# Patient Record
Sex: FEMALE | Race: WHITE | NOT HISPANIC OR LATINO | URBAN - METROPOLITAN AREA
[De-identification: names, ages, dates, MRNs, and addresses within clinical notes are randomized per-mention and may not be internally consistent; named-entity substitution may affect disease eponyms.]

---
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
## 2019-03-21 ENCOUNTER — OFFICE VISIT (OUTPATIENT)
Dept: URGENT CARE | Facility: CLINIC | Age: 19
End: 2019-03-21
Payer: COMMERCIAL

## 2019-03-21 VITALS
RESPIRATION RATE: 16 BRPM | SYSTOLIC BLOOD PRESSURE: 112 MMHG | BODY MASS INDEX: 24.89 KG/M2 | HEART RATE: 86 BPM | DIASTOLIC BLOOD PRESSURE: 62 MMHG | OXYGEN SATURATION: 98 % | HEIGHT: 68 IN | WEIGHT: 164.2 LBS | TEMPERATURE: 99.3 F

## 2019-03-21 DIAGNOSIS — H10.33 ACUTE BACTERIAL CONJUNCTIVITIS OF BOTH EYES: Primary | ICD-10-CM

## 2019-03-21 PROCEDURE — 99213 OFFICE O/P EST LOW 20 MIN: CPT | Performed by: PHYSICIAN ASSISTANT

## 2019-03-21 RX ORDER — POLYMYXIN B SULFATE AND TRIMETHOPRIM 1; 10000 MG/ML; [USP'U]/ML
1 SOLUTION OPHTHALMIC 3 TIMES DAILY
Qty: 10 ML | Refills: 0 | Status: SHIPPED | OUTPATIENT
Start: 2019-03-21 | End: 2019-03-28

## 2019-03-21 RX ORDER — POLYMYXIN B SULFATE AND TRIMETHOPRIM 1; 10000 MG/ML; [USP'U]/ML
1 SOLUTION OPHTHALMIC 3 TIMES DAILY
Qty: 10 ML | Refills: 0 | Status: SHIPPED | OUTPATIENT
Start: 2019-03-21 | End: 2019-03-21 | Stop reason: SDUPTHER

## 2019-03-21 ASSESSMENT — PAIN SCALES - GENERAL: PAINLEVEL: 0-NO PAIN

## 2019-03-21 NOTE — PROGRESS NOTES
"Clinic Note     SUBJECTIVE    Chief Complaint:  Chief Complaint   Patient presents with   • Conjunctivitis     possibel pink eye int he lefy eye. Woke up yesterday morning with it crusted shut        HPI:    Oraila Bee is a 18 y.o. female who presents for Conjunctivitis (possibel pink eye in the lefy eye. Woke up yesterday morning with it crusted shut )  .  Her right eye is slightly irritated, but not too bad.  She has had cold symptoms/congestion over the last 2-3 days, but nothing \"major\".  No visual changes.    The following have been reviewed and updated as appropriate in this visit:         No Known Allergies  Current Outpatient Medications   Medication Sig Dispense Refill   • levonorgestrel (KYLEENA) 17.5 mcg/24 hrs (5 yrs) 19.5 mg IUD 1 Device by intrauterine route Every 5 (five) years     • polymyxin B sulf-trimethoprim (POLYTRIM) ophthalmic solution Administer 1 drop into both eyes 3 (three) times a day for 7 days 10 mL 0     No current facility-administered medications for this visit.      Past Medical History:   Diagnosis Date   • Factor 5 Leiden mutation, heterozygous (CMS/HCC) ([facility])      Past Surgical History:   Procedure Laterality Date   • EXPLORATORY LAPAROTOMY     • TONSILLECTOMY AND ADENOIDECTOMY     • WISDOM TOOTH EXTRACTION       Family History   Problem Relation Age of Onset   • No Known Problems Mother    • No Known Problems Father      Social History     Socioeconomic History   • Marital status: Single     Spouse name: Not on file   • Number of children: Not on file   • Years of education: Not on file   • Highest education level: Not on file   Social Needs   • Financial resource strain: Not on file   • Food insecurity - worry: Not on file   • Food insecurity - inability: Not on file   • Transportation needs - medical: Not on file   • Transportation needs - non-medical: Not on file   Occupational History   • Not on file   Tobacco Use   • Smoking status: Never Smoker   • Smokeless tobacco: " "Never Used   Substance and Sexual Activity   • Alcohol use: Not on file   • Drug use: Not on file   • Sexual activity: Not on file   Other Topics Concern   • Not on file   Social History Narrative   • Not on file       Review of Systems:  Review of Systems   As per HPI    OBJECTIVE     Vitals:  /62 (BP Location: Right arm, Patient Position: Sitting, Cuff Size: Reg)   Pulse 86   Temp 37.4 °C (99.3 °F) (Temporal)   Resp 16   Ht 1.727 m (5' 8\")   Wt 74.5 kg   LMP 02/28/2019 (Exact Date)   SpO2 98%   BMI 24.97 kg/m²     PE:  Physical Exam   Constitutional: She appears well-developed and well-nourished. No distress.   HENT:   Head: Normocephalic and atraumatic.   Right Ear: Tympanic membrane normal.   Left Ear: Tympanic membrane normal.   Nose: No mucosal edema or rhinorrhea.   Mouth/Throat: Oropharynx is clear and moist.   Eyes: Pupils are equal, round, and reactive to light. EOM are normal. Right eye exhibits no discharge. Left eye exhibits discharge. Right conjunctiva is injected. Left conjunctiva is injected.   Neck: Neck supple.   Cardiovascular: Normal rate and regular rhythm.   Pulmonary/Chest: Effort normal.   Skin: Skin is warm and dry.   Psychiatric: She has a normal mood and affect. Her behavior is normal. Judgment and thought content normal.   Nursing note and vitals reviewed.        No results found for this or any previous visit (from the past 12 hour(s)).      ASSESSMENT:    Oralia was seen today for conjunctivitis.    Diagnoses and all orders for this visit:    Acute bacterial conjunctivitis of both eyes  -     Discontinue: polymyxin B sulf-trimethoprim (POLYTRIM) ophthalmic solution; Administer 1 drop into both eyes 3 (three) times a day for 7 days  -     polymyxin B sulf-trimethoprim (POLYTRIM) ophthalmic solution; Administer 1 drop into both eyes 3 (three) times a day for 7 days          PLAN  Patient advised to use the antibiotic drops as prescribed; use warm compresses to the eyes, avoid " eye strain; no contacts until antibiotics complete; return if not improving. Recheck if any change in vision, increased pain, or any concerning symptoms.      Patient verbalizes understanding of above information and will contact C with any further questions or concerns.    ZAN PUENTE

## 2022-05-09 ENCOUNTER — PATIENT MESSAGE (OUTPATIENT)
Dept: HEMATOLOGY/ONCOLOGY | Facility: CLINIC | Age: 22
End: 2022-05-09
Payer: COMMERCIAL

## 2022-05-10 ENCOUNTER — OFFICE VISIT (OUTPATIENT)
Dept: URGENT CARE | Facility: CLINIC | Age: 22
End: 2022-05-10
Payer: COMMERCIAL

## 2022-05-10 VITALS
WEIGHT: 160 LBS | DIASTOLIC BLOOD PRESSURE: 75 MMHG | RESPIRATION RATE: 18 BRPM | HEIGHT: 68 IN | BODY MASS INDEX: 24.25 KG/M2 | TEMPERATURE: 98 F | OXYGEN SATURATION: 97 % | HEART RATE: 82 BPM | SYSTOLIC BLOOD PRESSURE: 108 MMHG

## 2022-05-10 DIAGNOSIS — R07.9 CHEST PAIN, UNSPECIFIED TYPE: ICD-10-CM

## 2022-05-10 DIAGNOSIS — R10.12 COLICKY LEFT UPPER QUADRANT PAIN: Primary | ICD-10-CM

## 2022-05-10 DIAGNOSIS — R07.81 RIB PAIN: ICD-10-CM

## 2022-05-10 PROCEDURE — 3078F PR MOST RECENT DIASTOLIC BLOOD PRESSURE < 80 MM HG: ICD-10-PCS | Mod: CPTII,S$GLB,,

## 2022-05-10 PROCEDURE — 1160F PR REVIEW ALL MEDS BY PRESCRIBER/CLIN PHARMACIST DOCUMENTED: ICD-10-PCS | Mod: CPTII,S$GLB,,

## 2022-05-10 PROCEDURE — 93010 ELECTROCARDIOGRAM REPORT: CPT | Mod: S$GLB,,, | Performed by: INTERNAL MEDICINE

## 2022-05-10 PROCEDURE — 1159F MED LIST DOCD IN RCRD: CPT | Mod: CPTII,S$GLB,,

## 2022-05-10 PROCEDURE — 1159F PR MEDICATION LIST DOCUMENTED IN MEDICAL RECORD: ICD-10-PCS | Mod: CPTII,S$GLB,,

## 2022-05-10 PROCEDURE — 93005 ELECTROCARDIOGRAM TRACING: CPT | Mod: S$GLB,,,

## 2022-05-10 PROCEDURE — 1160F RVW MEDS BY RX/DR IN RCRD: CPT | Mod: CPTII,S$GLB,,

## 2022-05-10 PROCEDURE — 99203 OFFICE O/P NEW LOW 30 MIN: CPT | Mod: S$GLB,,,

## 2022-05-10 PROCEDURE — 3074F PR MOST RECENT SYSTOLIC BLOOD PRESSURE < 130 MM HG: ICD-10-PCS | Mod: CPTII,S$GLB,,

## 2022-05-10 PROCEDURE — 71046 X-RAY EXAM CHEST 2 VIEWS: CPT | Mod: FY,S$GLB,, | Performed by: RADIOLOGY

## 2022-05-10 PROCEDURE — 3074F SYST BP LT 130 MM HG: CPT | Mod: CPTII,S$GLB,,

## 2022-05-10 PROCEDURE — 3008F PR BODY MASS INDEX (BMI) DOCUMENTED: ICD-10-PCS | Mod: CPTII,S$GLB,,

## 2022-05-10 PROCEDURE — 71046 XR CHEST PA AND LATERAL: ICD-10-PCS | Mod: FY,S$GLB,, | Performed by: RADIOLOGY

## 2022-05-10 PROCEDURE — 3008F BODY MASS INDEX DOCD: CPT | Mod: CPTII,S$GLB,,

## 2022-05-10 PROCEDURE — 93005 EKG 12-LEAD: ICD-10-PCS | Mod: S$GLB,,,

## 2022-05-10 PROCEDURE — 93010 EKG 12-LEAD: ICD-10-PCS | Mod: S$GLB,,, | Performed by: INTERNAL MEDICINE

## 2022-05-10 PROCEDURE — 3078F DIAST BP <80 MM HG: CPT | Mod: CPTII,S$GLB,,

## 2022-05-10 PROCEDURE — 99203 PR OFFICE/OUTPT VISIT, NEW, LEVL III, 30-44 MIN: ICD-10-PCS | Mod: S$GLB,,,

## 2022-05-10 RX ORDER — ESCITALOPRAM OXALATE 20 MG/1
10 TABLET ORAL DAILY
COMMUNITY
End: 2022-09-05

## 2022-05-10 RX ORDER — BUPROPION HYDROCHLORIDE 100 MG/1
300 TABLET ORAL DAILY
COMMUNITY
End: 2023-02-22

## 2022-05-10 RX ORDER — QUETIAPINE FUMARATE 100 MG/1
TABLET, FILM COATED ORAL
COMMUNITY
End: 2022-05-10 | Stop reason: ALTCHOICE

## 2022-05-10 NOTE — PATIENT INSTRUCTIONS
- A referral for primary care and gastroenterology has been placed. Call [phone] to schedule an appointment.   - Keep pain diary as discussed. Write down when you feel the pain, what you were doing, what you recently ate or drank.     - Rest.    - Drink plenty of fluids.    - Acetaminophen (tylenol) or Ibuprofen (advil,motrin) as directed as needed for fever/pain. Avoid tylenol if you have a history of liver disease. Do not take ibuprofen if you have a history of GI bleeding, kidney disease, or if you take blood thinners.   - Ibuprofen dosing for adults: 400 mg by mouth every 4-6 hours as needed. Max: 2400 mg/day; Info: use lowest effective dose, shortest effective treatment duration; give w/ food if GI upset occurs.  - Tylenol dosing for adults: [By mouth route, immediate-release form] Dose: 325-1000 mg by mouth every 4-6h as needed; Max: 1 g/4h and 4 g/day from all sources. [By mouth route, extended-release form] Dose: 650-1300 mg Extended Release by mouth every 8h as needed; Max: 4 g/day from all sources.     - You must understand that you have received an Urgent Care treatment only and that you may be released before all of your medical problems are known or treated.   - You, the patient, will arrange for follow up care as instructed.   - If your condition worsens or fails to improve we recommend that you receive another evaluation at the ER immediately or contact your PCP to discuss your concerns or return here.   - Follow up with your PCP or specialty clinic as directed in the next 1-2 weeks if not improved or as needed.  You can call [phone] to schedule an appointment with the appropriate provider.    If your symptoms do not improve or worsen, go to the emergency room immediately.

## 2022-05-10 NOTE — LETTER
May 10, 2022      Urgent Care [address] [address]  Phone: [phone]  Fax: [phone]       Patient: Laney Kaiser   YOB: 2000  Date of Visit: 05/10/2022    To Whom It May Concern:    Danielle Kaiser  was at [facility] on 05/10/2022. The patient may return to work/school on 05/11/22 with no restrictions. If you have any questions or concerns, or if I can be of further assistance, please do not hesitate to contact me.    Sincerely,    Gabriela Catalan PA-C

## 2022-05-10 NOTE — PROGRESS NOTES
"Subjective:       Patient ID: Laney Kaiser is a 21 y.o. female.    Vitals:  height is 5' 8" (1.727 m) and weight is 72.6 kg (160 lb). Her temperature is 97.9 °F (36.6 °C). Her blood pressure is 108/75 and her pulse is 82. Her respiration is 18 and oxygen saturation is 97%.     Chief Complaint: Chest Pain    Patient states of having rib pain. Hx of factor 5 liden. Hx of blood clot in the arm. Not currently taking blood thinners.   Pain is tight/sharp pain x 1 month. When she feels the pain she feels dizziness and heart racing. She has hx of anxiety attacks and this feels different. She denies currently feeling the pain. She is not sure what sets off the pain. Pain is staying the same. Episodes occurs 1-2 times per day. Episodes last for about 1 -2 hours. She denies taking anything for it. She states when she feels it she stretches and it helps with the pain. Patient was sitting in bed watching TV when she first noticed the pain. She had something similar to this a few months ago that went away on its own.     Chest Pain   This is a new problem. The current episode started in the past 7 days (Saturday ). The onset quality is sudden. The problem occurs intermittently. The problem has been unchanged. The pain is present in the substernal region. The pain is at a severity of 6/10. The pain is moderate. The quality of the pain is described as tightness and sharp. The pain does not radiate. Associated symptoms include dizziness, nausea and shortness of breath. Pertinent negatives include no abdominal pain, back pain, claudication, cough, diaphoresis, exertional chest pressure, fever, headaches, hemoptysis, irregular heartbeat, leg pain, lower extremity edema, malaise/fatigue, near-syncope, numbness, orthopnea, palpitations, PND, sputum production, syncope, vomiting or weakness.       Constitution: Negative for sweating and fever.   HENT: Negative for ear pain, congestion, sinus pain, sinus pressure and sore " throat.    Cardiovascular: Positive for chest pain. Negative for leg swelling, palpitations, sob on exertion and passing out.   Eyes: Negative for eye pain and eye redness.   Respiratory: Positive for shortness of breath. Negative for chest tightness, cough, sputum production and bloody sputum.    Gastrointestinal: Positive for nausea. Negative for abdominal pain, vomiting and heartburn.   Musculoskeletal: Negative for back pain.   Neurological: Positive for dizziness. Negative for headaches, disorientation, altered mental status and numbness.   Psychiatric/Behavioral: Positive for confusion. Negative for altered mental status and disorientation.       Objective:      Physical Exam   Constitutional: She is oriented to person, place, and time. She appears well-developed. She is cooperative.  Non-toxic appearance. She does not appear ill. No distress.   HENT:   Head: Normocephalic and atraumatic.   Ears:   Right Ear: Hearing, tympanic membrane, external ear and ear canal normal.   Left Ear: Hearing, tympanic membrane, external ear and ear canal normal.   Nose: Nose normal. No mucosal edema, rhinorrhea or nasal deformity. No epistaxis. Right sinus exhibits no maxillary sinus tenderness and no frontal sinus tenderness. Left sinus exhibits no maxillary sinus tenderness and no frontal sinus tenderness.   Mouth/Throat: Uvula is midline, oropharynx is clear and moist and mucous membranes are normal. No trismus in the jaw. Normal dentition. No uvula swelling. No posterior oropharyngeal erythema.   Eyes: Conjunctivae and lids are normal. Right eye exhibits no discharge. Left eye exhibits no discharge. No scleral icterus.   Neck: Trachea normal and phonation normal. Neck supple.   Cardiovascular: Normal rate, regular rhythm, normal heart sounds and normal pulses.   Pulmonary/Chest: Effort normal and breath sounds normal. No stridor. No respiratory distress. She has no decreased breath sounds. She has no wheezes. She has no  rhonchi. She has no rales.   Abdominal: Normal appearance and bowel sounds are normal. She exhibits no distension, no pulsatile midline mass and no mass. Soft. There is generalized abdominal tenderness and tenderness in the right upper quadrant, right lower quadrant, epigastric area and left upper quadrant. There is no rebound, no guarding, no tenderness at McBurney's point, negative Robles's sign, no left CVA tenderness, negative Rovsing's sign and no right CVA tenderness. No hernia.   Musculoskeletal: Normal range of motion.         General: No deformity. Normal range of motion.   Neurological: She is alert and oriented to person, place, and time. She exhibits normal muscle tone. Coordination normal.   Skin: Skin is warm, dry, intact, not diaphoretic and not pale.   Psychiatric: Her speech is normal and behavior is normal. Judgment and thought content normal.   Nursing note and vitals reviewed.      EKG: normal sinus rhythm.       Assessment:       1. Rib pain    2. Chest pain, unspecified type    3. Colicky abdominal pain          Plan:         Discussed patient with Dr. Ferrer who advised and agrees with plan.     Rib pain  -     IN OFFICE EKG 12-LEAD (to Muse)    Chest pain, unspecified type  -     Ambulatory referral/consult to Internal Medicine  -     XR CHEST PA AND LATERAL; Future; Expected date: 05/10/2022    Colicky abdominal pain  -     Ambulatory referral/consult to Gastroenterology                 Patient Instructions   - A referral for primary care and gastroenterology has been placed. Call [phone] to schedule an appointment.   - Keep pain diary as discussed. Write down when you feel the pain, what you were doing, what you recently ate or drank.     - Rest.    - Drink plenty of fluids.    - Acetaminophen (tylenol) or Ibuprofen (advil,motrin) as directed as needed for fever/pain. Avoid tylenol if you have a history of liver disease. Do not take ibuprofen if you have a history of GI bleeding,  kidney disease, or if you take blood thinners.   - Ibuprofen dosing for adults: 400 mg by mouth every 4-6 hours as needed. Max: 2400 mg/day; Info: use lowest effective dose, shortest effective treatment duration; give w/ food if GI upset occurs.  - Tylenol dosing for adults: [By mouth route, immediate-release form] Dose: 325-1000 mg by mouth every 4-6h as needed; Max: 1 g/4h and 4 g/day from all sources. [By mouth route, extended-release form] Dose: 650-1300 mg Extended Release by mouth every 8h as needed; Max: 4 g/day from all sources.     - You must understand that you have received an Urgent Care treatment only and that you may be released before all of your medical problems are known or treated.   - You, the patient, will arrange for follow up care as instructed.   - If your condition worsens or fails to improve we recommend that you receive another evaluation at the ER immediately or contact your PCP to discuss your concerns or return here.   - Follow up with your PCP or specialty clinic as directed in the next 1-2 weeks if not improved or as needed.  You can call [phone] to schedule an appointment with the appropriate provider.    If your symptoms do not improve or worsen, go to the emergency room immediately.

## 2022-05-12 ENCOUNTER — TELEPHONE (OUTPATIENT)
Dept: ADMINISTRATIVE | Facility: OTHER | Age: 22
End: 2022-05-12
Payer: COMMERCIAL

## 2022-05-12 NOTE — TELEPHONE ENCOUNTER
Left voice message for patient to return call to schedule appointment from referral to Internal Medicine.  Magaly LYMAN [phone]

## 2022-05-22 ENCOUNTER — OFFICE VISIT (OUTPATIENT)
Dept: URGENT CARE | Facility: CLINIC | Age: 22
End: 2022-05-22
Payer: COMMERCIAL

## 2022-05-22 VITALS
OXYGEN SATURATION: 99 % | HEIGHT: 68 IN | BODY MASS INDEX: 24.25 KG/M2 | RESPIRATION RATE: 14 BRPM | HEART RATE: 89 BPM | WEIGHT: 160 LBS | DIASTOLIC BLOOD PRESSURE: 67 MMHG | SYSTOLIC BLOOD PRESSURE: 114 MMHG | TEMPERATURE: 98 F

## 2022-05-22 DIAGNOSIS — Z11.9 ENCOUNTER FOR SCREENING EXAMINATION FOR INFECTIOUS DISEASE: Primary | ICD-10-CM

## 2022-05-22 DIAGNOSIS — L03.031 PARONYCHIA OF GREAT TOE, RIGHT: ICD-10-CM

## 2022-05-22 LAB
CTP QC/QA: YES
SARS-COV-2 RDRP RESP QL NAA+PROBE: NEGATIVE

## 2022-05-22 PROCEDURE — 3078F PR MOST RECENT DIASTOLIC BLOOD PRESSURE < 80 MM HG: ICD-10-PCS | Mod: CPTII,S$GLB,, | Performed by: NURSE PRACTITIONER

## 2022-05-22 PROCEDURE — 99213 OFFICE O/P EST LOW 20 MIN: CPT | Mod: S$GLB,,, | Performed by: NURSE PRACTITIONER

## 2022-05-22 PROCEDURE — U0002 COVID-19 LAB TEST NON-CDC: HCPCS | Mod: QW,S$GLB,, | Performed by: NURSE PRACTITIONER

## 2022-05-22 PROCEDURE — 1159F MED LIST DOCD IN RCRD: CPT | Mod: CPTII,S$GLB,, | Performed by: NURSE PRACTITIONER

## 2022-05-22 PROCEDURE — U0002: ICD-10-PCS | Mod: QW,S$GLB,, | Performed by: NURSE PRACTITIONER

## 2022-05-22 PROCEDURE — 1160F RVW MEDS BY RX/DR IN RCRD: CPT | Mod: CPTII,S$GLB,, | Performed by: NURSE PRACTITIONER

## 2022-05-22 PROCEDURE — 99213 PR OFFICE/OUTPT VISIT, EST, LEVL III, 20-29 MIN: ICD-10-PCS | Mod: S$GLB,,, | Performed by: NURSE PRACTITIONER

## 2022-05-22 PROCEDURE — 3074F PR MOST RECENT SYSTOLIC BLOOD PRESSURE < 130 MM HG: ICD-10-PCS | Mod: CPTII,S$GLB,, | Performed by: NURSE PRACTITIONER

## 2022-05-22 PROCEDURE — 1159F PR MEDICATION LIST DOCUMENTED IN MEDICAL RECORD: ICD-10-PCS | Mod: CPTII,S$GLB,, | Performed by: NURSE PRACTITIONER

## 2022-05-22 PROCEDURE — 3008F BODY MASS INDEX DOCD: CPT | Mod: CPTII,S$GLB,, | Performed by: NURSE PRACTITIONER

## 2022-05-22 PROCEDURE — 1160F PR REVIEW ALL MEDS BY PRESCRIBER/CLIN PHARMACIST DOCUMENTED: ICD-10-PCS | Mod: CPTII,S$GLB,, | Performed by: NURSE PRACTITIONER

## 2022-05-22 PROCEDURE — 3078F DIAST BP <80 MM HG: CPT | Mod: CPTII,S$GLB,, | Performed by: NURSE PRACTITIONER

## 2022-05-22 PROCEDURE — 3008F PR BODY MASS INDEX (BMI) DOCUMENTED: ICD-10-PCS | Mod: CPTII,S$GLB,, | Performed by: NURSE PRACTITIONER

## 2022-05-22 PROCEDURE — 3074F SYST BP LT 130 MM HG: CPT | Mod: CPTII,S$GLB,, | Performed by: NURSE PRACTITIONER

## 2022-05-22 RX ORDER — CEPHALEXIN 500 MG/1
500 CAPSULE ORAL EVERY 12 HOURS
Qty: 14 CAPSULE | Refills: 0 | Status: SHIPPED | OUTPATIENT
Start: 2022-05-22 | End: 2022-05-29

## 2022-05-22 RX ORDER — MUPIROCIN 20 MG/G
OINTMENT TOPICAL
Qty: 22 G | Refills: 1 | Status: SHIPPED | OUTPATIENT
Start: 2022-05-22 | End: 2022-07-14 | Stop reason: ALTCHOICE

## 2022-05-22 NOTE — PROGRESS NOTES
"Subjective:       Patient ID: Laney Kaiser is a 21 y.o. female.    Vitals:  height is 5' 8" (1.727 m) and weight is 72.6 kg (160 lb). Her temperature is 98.3 °F (36.8 °C). Her blood pressure is 114/67 and her pulse is 89. Her respiration is 14 and oxygen saturation is 99%.     Chief Complaint: Wound Check and URI    Pt has been exposed to coworker that has covid and she is having sore throat and congestion starting a few days ago. Pt has not taken any medications for cold symptoms. She also has a hangnail on right foot great toe that has now gotten infected, noticing about 10 to 12 days ago. Pt has been applying salt water and neosporin to wound giving very little relief.   Provider note begins below  Patient states she has had some bloody crust drainage but no pus.    Wound Check  She was originally treated 10 to 14 days ago. Previous treatment included wound cleansing or irrigation. The maximum temperature noted was less than 100.4 F. There has been no drainage from the wound. The redness has not changed. The swelling has not changed. The pain has not changed.   URI   This is a new problem. The current episode started in the past 7 days. The problem has been unchanged. There has been no fever. Associated symptoms include congestion and a sore throat. Pertinent negatives include no coughing. She has tried nothing for the symptoms.       Constitution: Negative for sweating, fatigue and fever.   HENT: Positive for congestion and sore throat.    Respiratory: Negative for cough and shortness of breath.    Skin: Positive for wound and erythema.       Objective:      Physical Exam   Constitutional: She is oriented to person, place, and time.   HENT:   Head: Normocephalic and atraumatic.   Cardiovascular: Normal rate and regular rhythm.   Pulmonary/Chest: Effort normal. No respiratory distress.   Abdominal: Normal appearance.   Musculoskeletal:      Right foot: Right great toe: Exhibits swelling and tenderness. "        Feet:    Neurological: She is alert and oriented to person, place, and time.   Skin: Skin is warm and dry. erythema   Psychiatric: Her behavior is normal. Mood normal.         Results for orders placed or performed in visit on 05/22/22   POCT COVID-19 Rapid Screening   Result Value Ref Range    POC Rapid COVID Negative Negative     Acceptable Yes      Assessment:       1. Encounter for screening examination for infectious disease    2. Paronychia of great toe, right          Plan:       COVID test negative  Attempt to drain paronychia.  Only bloody discharge noted.  Bactroban and pressure dressing applied.  May remove pressure dressing in 2 hours.  Bactroban and Keflex.  Warm Epson salt soaks.  If not improving, follow-up with Podiatry.        Encounter for screening examination for infectious disease  -     POCT COVID-19 Rapid Screening    Paronychia of great toe, right  -     cephALEXin (KEFLEX) 500 MG capsule; Take 1 capsule (500 mg total) by mouth every 12 (twelve) hours. for 7 days  Dispense: 14 capsule; Refill: 0  -     mupirocin (BACTROBAN) 2 % ointment; Apply to affected area 3 times daily x 7 days  Dispense: 22 g; Refill: 1

## 2022-05-31 ENCOUNTER — OFFICE VISIT (OUTPATIENT)
Dept: URGENT CARE | Facility: CLINIC | Age: 22
End: 2022-05-31
Payer: COMMERCIAL

## 2022-05-31 VITALS
TEMPERATURE: 99 F | WEIGHT: 170 LBS | HEIGHT: 68 IN | BODY MASS INDEX: 25.76 KG/M2 | DIASTOLIC BLOOD PRESSURE: 73 MMHG | SYSTOLIC BLOOD PRESSURE: 115 MMHG | RESPIRATION RATE: 16 BRPM | HEART RATE: 88 BPM | OXYGEN SATURATION: 98 %

## 2022-05-31 DIAGNOSIS — B34.9 ACUTE VIRAL SYNDROME: Primary | ICD-10-CM

## 2022-05-31 DIAGNOSIS — R11.0 NAUSEA: ICD-10-CM

## 2022-05-31 LAB
B-HCG UR QL: NEGATIVE
CTP QC/QA: YES
CTP QC/QA: YES
SARS-COV-2 RDRP RESP QL NAA+PROBE: NEGATIVE

## 2022-05-31 PROCEDURE — 99213 PR OFFICE/OUTPT VISIT, EST, LEVL III, 20-29 MIN: ICD-10-PCS | Mod: S$GLB,,, | Performed by: NURSE PRACTITIONER

## 2022-05-31 PROCEDURE — 99213 OFFICE O/P EST LOW 20 MIN: CPT | Mod: S$GLB,,, | Performed by: NURSE PRACTITIONER

## 2022-05-31 PROCEDURE — U0002: ICD-10-PCS | Mod: QW,S$GLB,, | Performed by: NURSE PRACTITIONER

## 2022-05-31 PROCEDURE — 1160F RVW MEDS BY RX/DR IN RCRD: CPT | Mod: CPTII,S$GLB,, | Performed by: NURSE PRACTITIONER

## 2022-05-31 PROCEDURE — 3078F PR MOST RECENT DIASTOLIC BLOOD PRESSURE < 80 MM HG: ICD-10-PCS | Mod: CPTII,S$GLB,, | Performed by: NURSE PRACTITIONER

## 2022-05-31 PROCEDURE — 81025 URINE PREGNANCY TEST: CPT | Mod: S$GLB,,, | Performed by: NURSE PRACTITIONER

## 2022-05-31 PROCEDURE — 81025 POCT URINE PREGNANCY: ICD-10-PCS | Mod: S$GLB,,, | Performed by: NURSE PRACTITIONER

## 2022-05-31 PROCEDURE — U0002 COVID-19 LAB TEST NON-CDC: HCPCS | Mod: QW,S$GLB,, | Performed by: NURSE PRACTITIONER

## 2022-05-31 PROCEDURE — 3074F PR MOST RECENT SYSTOLIC BLOOD PRESSURE < 130 MM HG: ICD-10-PCS | Mod: CPTII,S$GLB,, | Performed by: NURSE PRACTITIONER

## 2022-05-31 PROCEDURE — 1160F PR REVIEW ALL MEDS BY PRESCRIBER/CLIN PHARMACIST DOCUMENTED: ICD-10-PCS | Mod: CPTII,S$GLB,, | Performed by: NURSE PRACTITIONER

## 2022-05-31 PROCEDURE — 1159F PR MEDICATION LIST DOCUMENTED IN MEDICAL RECORD: ICD-10-PCS | Mod: CPTII,S$GLB,, | Performed by: NURSE PRACTITIONER

## 2022-05-31 PROCEDURE — 3008F PR BODY MASS INDEX (BMI) DOCUMENTED: ICD-10-PCS | Mod: CPTII,S$GLB,, | Performed by: NURSE PRACTITIONER

## 2022-05-31 PROCEDURE — 3008F BODY MASS INDEX DOCD: CPT | Mod: CPTII,S$GLB,, | Performed by: NURSE PRACTITIONER

## 2022-05-31 PROCEDURE — 3078F DIAST BP <80 MM HG: CPT | Mod: CPTII,S$GLB,, | Performed by: NURSE PRACTITIONER

## 2022-05-31 PROCEDURE — 1159F MED LIST DOCD IN RCRD: CPT | Mod: CPTII,S$GLB,, | Performed by: NURSE PRACTITIONER

## 2022-05-31 PROCEDURE — 3074F SYST BP LT 130 MM HG: CPT | Mod: CPTII,S$GLB,, | Performed by: NURSE PRACTITIONER

## 2022-05-31 RX ORDER — ONDANSETRON 4 MG/1
TABLET, ORALLY DISINTEGRATING ORAL
Qty: 10 TABLET | Refills: 0 | Status: SHIPPED | OUTPATIENT
Start: 2022-05-31 | End: 2022-07-06

## 2022-05-31 NOTE — LETTER
[id number] ? [place], [address] ? Phone [phone] ? Fax [phone]           Return to Work/School    Patient: Laney Kaiser  YOB: 2000   Date: 05/31/2022      To Whom It May Concern:     Laney Kaiser was in contact with/seen in my office on 05/31/2022. COVID-19 is present in our communities across the [facility]. Not all patients are eligible or appropriate to be tested. In this situation, your employee meets the following criteria:     Laney Kaiser has met the criteria for COVID-19 testing and has a NEGATIVE result. The employee can return to work once they are asymptomatic for 24 hours without the use of fever reducing medications (Tylenol, Motrin, etc).     If you have any questions or concerns, or if I can be of further assistance, please do not hesitate to contact me.     Sincerely,          Senait Serra NP         sSubjective:      Patient ID: Kirstin Vega is a 15 y.o. female.    Chief Complaint: Finger Pain    HPI     Patient presents clinic today for evaluation of swelling to the left 2nd digit.  She does not recall definitive injury or trauma.  She reports the swelling has been there for approximately 4 weeks.  She is able to do her normal daily activities however she does have some pain when she wears her baseball glove on her left hand.  She presents for further evaluation    Update 2/15/22:  Patient returns for follow-up.  She reports increased swelling in the left 2nd digit and a new swelling that began in the right 2nd digit.  She denies any definitive injury or trauma.  She continues to participate in her softball activities.  She also reports a new onset of pain in the balls of both feet that began approximately 2-3 weeks ago.  She has been taking ibuprofen 400 mg as needed for pain..  There is no pertinent family history of any rheumatological conditions per g on.  She presents for re-evaluation today      Review of patient's allergies indicates:  No Known Allergies    Past Medical History:   Diagnosis Date    ADHD (attention deficit hyperactivity disorder)      History reviewed. No pertinent surgical history.  Family History   Problem Relation Age of Onset    Heart disease Paternal Grandfather         stents    Heart attack Other 47       Current Outpatient Medications on File Prior to Visit   Medication Sig Dispense Refill    cloNIDine (CATAPRES) 0.1 MG tablet Take 0.1 mg by mouth 2 (two) times daily.      dextroamphetamine-amphetamine (ADDERALL XR) 30 MG 24 hr capsule Take 1 capsule (30 mg total) by mouth every morning. 30 capsule 0    methylphenidate HCl 54 MG CR tablet Take 54 mg by mouth every morning.       No current facility-administered medications on file prior to visit.       Social History     Social History Narrative    Lives with mom     Play volleyballl and softball    Oscar Virgie  "      ROS    Review of Systems:  Constitutional: No unintentional weight loss, fevers, chills  Eyes: No change in vision, blurred vision  HEENT: No change in vision, blurred vision, nose bleeds, sore throat  Cardiovascular: No chest pain, palpitations  Respiratory: No wheezing, shortness of breath, cough  Gastrointestinal: No nausea, vomiting, changes in bowel habits  Genitourinary: No painful urination, incontinence  Musculoskeletal: Per HPI  Skin: No rashes, itching  Neurologic: No numbness, tingling  Hematologic: No bruising/bleeding    Objective:      Pediatric Orthopedic Exam     Physical Exam:  Constitutional: Ht 5' 1.42" (1.56 m)   Wt 55.3 kg (122 lb)   BMI 22.74 kg/m²    General: Alert, oriented, in no acute distress, non-syndromic appearing facies  Eyes: Conjunctiva normal, extra-ocular movements intact  Ears, Nose, Mouth, Throat: External ears and nose normal  Cardiovascular: No edema  Respiratory: Regular work of breathing  Psychiatric: Oriented to time, place, and person  Skin: No skin abnormalities    Left hand exam  Moderate soft tissue swelling and bruising is noted throughout the left 2nd digit  There is tenderness palpation of the left 2nd proximal interphalangeal joint  New swelling is noted to right index finger  Patient exhibits good flexion extension of the left 2nd PIP joint with some limitation due to swelling  There is no instability or ligamentous laxity of the left 2nd digit  Good sensation to light touch  Brisk capillary refill in all the digits      Assessment:       1. Finger joint swelling, left    2. Finger joint swelling, right    3. Metatarsalgia of both feet            Plan:       Since the patient has a new onset of joint swelling without definitive injury or trauma and continued joint swelling on the left side, we will have her undergo serum laboratory testing to rule out any underlying infection or inflammatory condition.  She will be given a prescription for ibuprofen 600 mg " to be taken 3 times daily for pain.  I will contact her via phone regarding the results of her labs from today.  They verbalized good understanding of plan

## 2022-05-31 NOTE — PROGRESS NOTES
"Subjective:       Patient ID: Laney Kaiser is a 21 y.o. female.    Vitals:  height is 5' 8" (1.727 m) and weight is 77.1 kg (170 lb). Her temperature is 98.8 °F (37.1 °C). Her blood pressure is 115/73 and her pulse is 88. Her respiration is 16 and oxygen saturation is 98%.     Chief Complaint: Nausea (/)    Nausea and vomiting twice this morning and congestion. Associated few episodes of watery diarrhea.  She recently flew home from [place] and thinks she just caught a virus.  Denies fever or abdominal pain.  Denies bloody or black stool.      Nausea  This is a new problem. The current episode started yesterday. The problem occurs constantly. The problem has been unchanged. Associated symptoms include congestion, fatigue, headaches, nausea and vomiting. Pertinent negatives include no abdominal pain, anorexia, arthralgias, change in bowel habit, chest pain, chills, coughing, diaphoresis, fever, joint swelling, myalgias, neck pain, numbness, rash, sore throat, swollen glands, urinary symptoms, vertigo, visual change or weakness.       Constitution: Positive for fatigue. Negative for chills, sweating and fever.   HENT: Positive for congestion. Negative for sore throat.    Neck: Negative for neck pain.   Cardiovascular: Negative for chest pain.   Respiratory: Negative for cough.    Gastrointestinal: Positive for nausea, vomiting and diarrhea. Negative for abdominal pain, constipation, bright red blood in stool, dark colored stools, rectal pain, hemorrhoids and heartburn.   Musculoskeletal: Negative for joint pain, joint swelling and muscle ache.   Skin: Negative for rash.   Neurological: Positive for headaches. Negative for history of vertigo and numbness.       Objective:      Physical Exam   Constitutional: She is oriented to person, place, and time. She appears well-developed. She is cooperative.  Non-toxic appearance. She does not appear ill. No distress.   HENT:   Head: Normocephalic and atraumatic. "   Ears:   Right Ear: Hearing, tympanic membrane, external ear and ear canal normal.   Left Ear: Hearing, tympanic membrane, external ear and ear canal normal.   Nose: Nose normal. No mucosal edema, rhinorrhea or nasal deformity. No epistaxis. Right sinus exhibits no maxillary sinus tenderness and no frontal sinus tenderness. Left sinus exhibits no maxillary sinus tenderness and no frontal sinus tenderness.   Mouth/Throat: Uvula is midline, oropharynx is clear and moist and mucous membranes are normal. Mucous membranes are moist. No trismus in the jaw. Normal dentition. No uvula swelling. No oropharyngeal exudate, posterior oropharyngeal edema or posterior oropharyngeal erythema.   Eyes: Conjunctivae and lids are normal. No scleral icterus.   Neck: Trachea normal and phonation normal. Neck supple. No edema present. No erythema present. No neck rigidity present.   Cardiovascular: Normal rate, regular rhythm, normal heart sounds and normal pulses.   Pulmonary/Chest: Effort normal and breath sounds normal. No respiratory distress. She has no decreased breath sounds. She has no rhonchi.   Abdominal: Normal appearance and bowel sounds are normal. She exhibits no distension, no abdominal bruit, no pulsatile midline mass and no mass. Soft. flat abdomen There is no abdominal tenderness. There is no rebound, no guarding, no left CVA tenderness and no right CVA tenderness. No hernia.   Musculoskeletal: Normal range of motion.         General: No deformity. Normal range of motion.   Neurological: She is alert and oriented to person, place, and time. She has normal strength. She exhibits normal muscle tone. Coordination normal.   Skin: Skin is warm, dry, intact, not diaphoretic and not pale.   Psychiatric: Her speech is normal and behavior is normal. Judgment and thought content normal.   Nursing note and vitals reviewed.    Results for orders placed or performed in visit on 05/31/22   POCT COVID-19 Rapid Screening   Result  Value Ref Range    POC Rapid COVID Negative Negative     Acceptable Yes    POCT urine pregnancy   Result Value Ref Range    POC Preg Test, Ur Negative Negative     Acceptable Yes            Assessment:       1. Acute viral syndrome    2. Nausea          Plan:       Lab reviewed.  Acute viral syndrome  -     ondansetron (ZOFRAN-ODT) 4 MG TbDL; One tablet sublingual tid prn nausea  Dispense: 10 tablet; Refill: 0    Nausea  -     POCT COVID-19 Rapid Screening  -     POCT urine pregnancy  -     ondansetron (ZOFRAN-ODT) 4 MG TbDL; One tablet sublingual tid prn nausea  Dispense: 10 tablet; Refill: 0      Patient Instructions     If your condition worsens or fails to improve we recommend that you receive another evaluation at the ER immediately or contact your PCP to discuss your concerns or return here. You must understand that you've received an urgent care treatment only and that you may be released before all your medical problems are known or treated. You the patient will arrange for followup care as instructed.   Zofran as needed for nausea or vomiting.  Maintain hydration by drinking small amounts of clear fluids frequently, then soft diet, and then advance diet as tolerated. May use OTC Imodium OR Pepto Bismol if desired for any diarrhea.    Watch for any increase pain, fever, localized pain to right lower abdomen or continued vomiting or diarrhea.

## 2022-05-31 NOTE — PATIENT INSTRUCTIONS
If your condition worsens or fails to improve we recommend that you receive another evaluation at the ER immediately or contact your PCP to discuss your concerns or return here. You must understand that you've received an urgent care treatment only and that you may be released before all your medical problems are known or treated. You the patient will arrange for followup care as instructed.   Zofran as needed for nausea or vomiting.  Maintain hydration by drinking small amounts of clear fluids frequently, then soft diet, and then advance diet as tolerated. May use OTC Imodium OR Pepto Bismol if desired for any diarrhea.    Watch for any increase pain, fever, localized pain to right lower abdomen or continued vomiting or diarrhea.

## 2022-07-06 ENCOUNTER — OFFICE VISIT (OUTPATIENT)
Dept: OBSTETRICS AND GYNECOLOGY | Facility: CLINIC | Age: 22
End: 2022-07-06
Attending: OBSTETRICS & GYNECOLOGY
Payer: COMMERCIAL

## 2022-07-06 VITALS
HEIGHT: 68 IN | SYSTOLIC BLOOD PRESSURE: 110 MMHG | WEIGHT: 164.88 LBS | BODY MASS INDEX: 24.99 KG/M2 | DIASTOLIC BLOOD PRESSURE: 80 MMHG

## 2022-07-06 DIAGNOSIS — Z30.09 GENERAL COUNSELING AND ADVICE FOR CONTRACEPTIVE MANAGEMENT: ICD-10-CM

## 2022-07-06 DIAGNOSIS — F31.9 BIPOLAR AFFECTIVE DISORDER, REMISSION STATUS UNSPECIFIED: ICD-10-CM

## 2022-07-06 DIAGNOSIS — D68.51 FACTOR V LEIDEN MUTATION: ICD-10-CM

## 2022-07-06 DIAGNOSIS — Z01.419 WELL WOMAN EXAM: Primary | ICD-10-CM

## 2022-07-06 PROCEDURE — 99385 PREV VISIT NEW AGE 18-39: CPT | Mod: S$GLB,,, | Performed by: OBSTETRICS & GYNECOLOGY

## 2022-07-06 PROCEDURE — 3008F BODY MASS INDEX DOCD: CPT | Mod: CPTII,S$GLB,, | Performed by: OBSTETRICS & GYNECOLOGY

## 2022-07-06 PROCEDURE — 99385 PR PREVENTIVE VISIT,NEW,18-39: ICD-10-PCS | Mod: S$GLB,,, | Performed by: OBSTETRICS & GYNECOLOGY

## 2022-07-06 PROCEDURE — 3079F DIAST BP 80-89 MM HG: CPT | Mod: CPTII,S$GLB,, | Performed by: OBSTETRICS & GYNECOLOGY

## 2022-07-06 PROCEDURE — 3074F SYST BP LT 130 MM HG: CPT | Mod: CPTII,S$GLB,, | Performed by: OBSTETRICS & GYNECOLOGY

## 2022-07-06 PROCEDURE — 3074F PR MOST RECENT SYSTOLIC BLOOD PRESSURE < 130 MM HG: ICD-10-PCS | Mod: CPTII,S$GLB,, | Performed by: OBSTETRICS & GYNECOLOGY

## 2022-07-06 PROCEDURE — 99999 PR PBB SHADOW E&M-EST. PATIENT-LVL III: ICD-10-PCS | Mod: PBBFAC,,, | Performed by: OBSTETRICS & GYNECOLOGY

## 2022-07-06 PROCEDURE — 1160F PR REVIEW ALL MEDS BY PRESCRIBER/CLIN PHARMACIST DOCUMENTED: ICD-10-PCS | Mod: CPTII,S$GLB,, | Performed by: OBSTETRICS & GYNECOLOGY

## 2022-07-06 PROCEDURE — 3079F PR MOST RECENT DIASTOLIC BLOOD PRESSURE 80-89 MM HG: ICD-10-PCS | Mod: CPTII,S$GLB,, | Performed by: OBSTETRICS & GYNECOLOGY

## 2022-07-06 PROCEDURE — 87591 N.GONORRHOEAE DNA AMP PROB: CPT | Performed by: OBSTETRICS & GYNECOLOGY

## 2022-07-06 PROCEDURE — 1159F PR MEDICATION LIST DOCUMENTED IN MEDICAL RECORD: ICD-10-PCS | Mod: CPTII,S$GLB,, | Performed by: OBSTETRICS & GYNECOLOGY

## 2022-07-06 PROCEDURE — 99999 PR PBB SHADOW E&M-EST. PATIENT-LVL III: CPT | Mod: PBBFAC,,, | Performed by: OBSTETRICS & GYNECOLOGY

## 2022-07-06 PROCEDURE — 1159F MED LIST DOCD IN RCRD: CPT | Mod: CPTII,S$GLB,, | Performed by: OBSTETRICS & GYNECOLOGY

## 2022-07-06 PROCEDURE — 1160F RVW MEDS BY RX/DR IN RCRD: CPT | Mod: CPTII,S$GLB,, | Performed by: OBSTETRICS & GYNECOLOGY

## 2022-07-06 PROCEDURE — 87491 CHLMYD TRACH DNA AMP PROBE: CPT | Performed by: OBSTETRICS & GYNECOLOGY

## 2022-07-06 PROCEDURE — 3008F PR BODY MASS INDEX (BMI) DOCUMENTED: ICD-10-PCS | Mod: CPTII,S$GLB,, | Performed by: OBSTETRICS & GYNECOLOGY

## 2022-07-06 RX ORDER — QUETIAPINE FUMARATE 50 MG/1
50 TABLET, FILM COATED ORAL NIGHTLY
COMMUNITY
Start: 2022-07-01 | End: 2023-02-22

## 2022-07-06 RX ORDER — SERTRALINE HYDROCHLORIDE 50 MG/1
50 TABLET, FILM COATED ORAL DAILY
COMMUNITY
Start: 2022-07-01 | End: 2022-09-05
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
## 2022-07-09 LAB
C TRACH DNA SPEC QL NAA+PROBE: NOT DETECTED
N GONORRHOEA DNA SPEC QL NAA+PROBE: NOT DETECTED

## 2022-07-14 ENCOUNTER — OFFICE VISIT (OUTPATIENT)
Dept: URGENT CARE | Facility: CLINIC | Age: 22
End: 2022-07-14
Payer: COMMERCIAL

## 2022-07-14 VITALS
SYSTOLIC BLOOD PRESSURE: 137 MMHG | TEMPERATURE: 98 F | WEIGHT: 165 LBS | DIASTOLIC BLOOD PRESSURE: 74 MMHG | BODY MASS INDEX: 25.01 KG/M2 | HEIGHT: 68 IN | HEART RATE: 97 BPM | OXYGEN SATURATION: 98 %

## 2022-07-14 DIAGNOSIS — R11.0 NAUSEA: ICD-10-CM

## 2022-07-14 DIAGNOSIS — R20.2 PARESTHESIAS: Primary | ICD-10-CM

## 2022-07-14 DIAGNOSIS — Z76.89 ENCOUNTER TO ESTABLISH CARE: ICD-10-CM

## 2022-07-14 DIAGNOSIS — R35.0 URINARY FREQUENCY: ICD-10-CM

## 2022-07-14 LAB
BILIRUB UR QL STRIP: NEGATIVE
GLUCOSE SERPL-MCNC: 83 MG/DL (ref 70–110)
GLUCOSE UR QL STRIP: NEGATIVE
KETONES UR QL STRIP: NEGATIVE
LEUKOCYTE ESTERASE UR QL STRIP: NEGATIVE
PH, POC UA: 5.5 (ref 5–8)
POC BLOOD, URINE: NEGATIVE
POC NITRATES, URINE: NEGATIVE
PROT UR QL STRIP: NEGATIVE
SP GR UR STRIP: 1.01 (ref 1–1.03)
UROBILINOGEN UR STRIP-ACNC: NEGATIVE (ref 0.1–1.1)

## 2022-07-14 PROCEDURE — 82962 GLUCOSE BLOOD TEST: CPT | Mod: S$GLB,,, | Performed by: NURSE PRACTITIONER

## 2022-07-14 PROCEDURE — 1159F PR MEDICATION LIST DOCUMENTED IN MEDICAL RECORD: ICD-10-PCS | Mod: CPTII,S$GLB,, | Performed by: NURSE PRACTITIONER

## 2022-07-14 PROCEDURE — S0119 PR ONDANSETRON, ORAL, 4MG: ICD-10-PCS | Mod: S$GLB,,, | Performed by: NURSE PRACTITIONER

## 2022-07-14 PROCEDURE — 1160F PR REVIEW ALL MEDS BY PRESCRIBER/CLIN PHARMACIST DOCUMENTED: ICD-10-PCS | Mod: CPTII,S$GLB,, | Performed by: NURSE PRACTITIONER

## 2022-07-14 PROCEDURE — 3075F PR MOST RECENT SYSTOLIC BLOOD PRESS GE 130-139MM HG: ICD-10-PCS | Mod: CPTII,S$GLB,, | Performed by: NURSE PRACTITIONER

## 2022-07-14 PROCEDURE — 1160F RVW MEDS BY RX/DR IN RCRD: CPT | Mod: CPTII,S$GLB,, | Performed by: NURSE PRACTITIONER

## 2022-07-14 PROCEDURE — 81003 URINALYSIS AUTO W/O SCOPE: CPT | Mod: QW,S$GLB,, | Performed by: NURSE PRACTITIONER

## 2022-07-14 PROCEDURE — 3075F SYST BP GE 130 - 139MM HG: CPT | Mod: CPTII,S$GLB,, | Performed by: NURSE PRACTITIONER

## 2022-07-14 PROCEDURE — 1159F MED LIST DOCD IN RCRD: CPT | Mod: CPTII,S$GLB,, | Performed by: NURSE PRACTITIONER

## 2022-07-14 PROCEDURE — 3078F DIAST BP <80 MM HG: CPT | Mod: CPTII,S$GLB,, | Performed by: NURSE PRACTITIONER

## 2022-07-14 PROCEDURE — 81003 POCT URINALYSIS, DIPSTICK, AUTOMATED, W/O SCOPE: ICD-10-PCS | Mod: QW,S$GLB,, | Performed by: NURSE PRACTITIONER

## 2022-07-14 PROCEDURE — 3078F PR MOST RECENT DIASTOLIC BLOOD PRESSURE < 80 MM HG: ICD-10-PCS | Mod: CPTII,S$GLB,, | Performed by: NURSE PRACTITIONER

## 2022-07-14 PROCEDURE — 82962 POCT GLUCOSE, HAND-HELD DEVICE: ICD-10-PCS | Mod: S$GLB,,, | Performed by: NURSE PRACTITIONER

## 2022-07-14 PROCEDURE — 99213 PR OFFICE/OUTPT VISIT, EST, LEVL III, 20-29 MIN: ICD-10-PCS | Mod: S$GLB,,, | Performed by: NURSE PRACTITIONER

## 2022-07-14 PROCEDURE — 99213 OFFICE O/P EST LOW 20 MIN: CPT | Mod: S$GLB,,, | Performed by: NURSE PRACTITIONER

## 2022-07-14 PROCEDURE — 3008F BODY MASS INDEX DOCD: CPT | Mod: CPTII,S$GLB,, | Performed by: NURSE PRACTITIONER

## 2022-07-14 PROCEDURE — 3008F PR BODY MASS INDEX (BMI) DOCUMENTED: ICD-10-PCS | Mod: CPTII,S$GLB,, | Performed by: NURSE PRACTITIONER

## 2022-07-14 PROCEDURE — S0119 ONDANSETRON 4 MG: HCPCS | Mod: S$GLB,,, | Performed by: NURSE PRACTITIONER

## 2022-07-14 RX ORDER — ONDANSETRON 4 MG/1
4 TABLET, ORALLY DISINTEGRATING ORAL
Status: COMPLETED | OUTPATIENT
Start: 2022-07-14 | End: 2022-07-14

## 2022-07-14 RX ADMIN — ONDANSETRON 4 MG: 4 TABLET, ORALLY DISINTEGRATING ORAL at 04:07

## 2022-07-14 NOTE — PROGRESS NOTES
"Subjective:       Patient ID: Laney Kaiser is a 21 y.o. female.    Vitals:  height is 5' 8" (1.727 m) and weight is 74.8 kg (165 lb). Her temperature is 97.6 °F (36.4 °C). Her blood pressure is 137/74 and her pulse is 97. Her oxygen saturation is 98%.     Chief Complaint: Abdominal Pain    Pt presents with abdominal pain, tingling of the hands and feet, nausea, fatigue. Pt also reports lightheadedness, urinary frequency. Pt states she recently moved to [facility] from [place] in May and thinks all symptoms may be attributed to the change in climate. Pt also reports nausea, lactose intolerance, and states the cafe this morning put whole milk into her latte. However pt has been experiencing tingling of hands and toes for the last few years. Urinary frequency has been present for the last three weeks.Pt treating stomach upset with tums with mild relief.  No other symptoms.     Provider note begins below  Patient reports a history of bipolar disorder.  She has recently been weaned from Lexapro to Zoloft.  She has been having tingling in her fingers and toes bilaterally.  She reports some lightheadedness.  She does not have a PCP locally.  She reports lactose intolerance.  She has some nausea from that.  She has taken Tums and Lactaid with mild relief.  She is not sleeping well.  She does take Seroquel at night that helps at times.  Reports urinary frequency every hour.  Denies chance of pregnancy.  She has a IUD.  Patient has not seen GI as per referral at previous visit.  Pt reports noticing the tingling as long as 2 years ago.  Denies heavy uterine bleeding or GI bleed. Reports a family history of thyroid issues.  Patient states she has been having increasing depression lately and that is why she switched with the help of her psychiatrist Zoloft.  Patient reports that she is eating, exercising, visiting with her sister and taking showers.    Abdominal Pain  This is a new problem. The current episode started " today. The onset quality is sudden. The problem occurs constantly. The problem has been unchanged. The pain is at a severity of 5/10. The pain is mild. The quality of the pain is aching. Associated symptoms include frequency and nausea.       Gastrointestinal: Positive for abdominal pain and nausea.   Genitourinary: Positive for frequency.   Neurological: Positive for numbness and tingling. Negative for disorientation and altered mental status.   Psychiatric/Behavioral: Positive for nervous/anxious and sleep disturbance. Negative for altered mental status, disorientation, confusion, homicidal ideas, suicidal ideas and self-injury. The patient is nervous/anxious.        Objective:      Physical Exam   Constitutional: She is oriented to person, place, and time. She is cooperative.   HENT:   Head: Normocephalic and atraumatic.   Nose: Nose normal.   Mouth/Throat: Mucous membranes are moist.   Cardiovascular: Normal rate, regular rhythm, normal heart sounds and normal pulses.   Pulmonary/Chest: Effort normal and breath sounds normal. No respiratory distress.   Abdominal: Normal appearance. She exhibits no distension and no mass. Soft. There is no abdominal tenderness. No hernia.   Neurological: She is alert and oriented to person, place, and time.   Skin: Skin is warm, dry and not diaphoretic.   Psychiatric: She experiences No auditory hallucinations. Her behavior is normal. Her mood appears anxious. Her speech is not slurred. She is attentive.       Results for orders placed or performed in visit on 07/14/22   POCT Urinalysis, Dipstick, Automated, W/O Scope   Result Value Ref Range    POC Blood, Urine Negative Negative    POC Bilirubin, Urine Negative Negative    POC Urobilinogen, Urine negative 0.1 - 1.1    POC Ketones, Urine Negative Negative    POC Protein, Urine Negative Negative    POC Nitrates, Urine Negative Negative    POC Glucose, Urine Negative Negative    pH, UA 5.5 5 - 8    POC Specific Gravity, Urine 1.015  1.003 - 1.029    POC Leukocytes, Urine Negative Negative   POCT Glucose, Hand-Held Device   Result Value Ref Range    POC Glucose 83 70 - 110 MG/DL         Assessment:       1. Paresthesias    2. Nausea    3. Urinary frequency    4. Encounter to establish care          Plan:       Follow-up with psychiatrist as soon as possible to discuss symptoms.  Will put in referral for PCP.  Appointment for 7/22/2022  zofran for nausea.  Some improvement with Zofran.  Suspect medication side effect, b vitamin deficiency, thyroid issue or anxiety  Glucose normal  UA normal  ED precautions.      Paresthesias  -     POCT Glucose, Hand-Held Device    Nausea  -     ondansetron disintegrating tablet 4 mg    Urinary frequency  -     POCT Urinalysis, Dipstick, Automated, W/O Scope    Encounter to establish care  -     Ambulatory referral/consult to Internal Medicine

## 2022-07-18 ENCOUNTER — TELEPHONE (OUTPATIENT)
Dept: INTERNAL MEDICINE | Facility: CLINIC | Age: 22
End: 2022-07-18
Payer: COMMERCIAL

## 2022-08-16 ENCOUNTER — OFFICE VISIT (OUTPATIENT)
Dept: PSYCHIATRY | Facility: CLINIC | Age: 22
End: 2022-08-16
Payer: COMMERCIAL

## 2022-08-16 VITALS
BODY MASS INDEX: 25.61 KG/M2 | HEART RATE: 98 BPM | SYSTOLIC BLOOD PRESSURE: 110 MMHG | TEMPERATURE: 98 F | WEIGHT: 168.44 LBS | DIASTOLIC BLOOD PRESSURE: 60 MMHG

## 2022-08-16 DIAGNOSIS — F31.81 BIPOLAR 2 DISORDER: Primary | ICD-10-CM

## 2022-08-16 DIAGNOSIS — F41.1 GAD (GENERALIZED ANXIETY DISORDER): ICD-10-CM

## 2022-08-16 DIAGNOSIS — F60.3 BORDERLINE PERSONALITY DISORDER: ICD-10-CM

## 2022-08-16 DIAGNOSIS — F43.10 PTSD (POST-TRAUMATIC STRESS DISORDER): ICD-10-CM

## 2022-08-16 PROCEDURE — 90792 PR PSYCHIATRIC DIAGNOSTIC EVALUATION W/MEDICAL SERVICES: ICD-10-PCS | Mod: S$GLB,,, | Performed by: PSYCHIATRY & NEUROLOGY

## 2022-08-16 PROCEDURE — 1159F MED LIST DOCD IN RCRD: CPT | Mod: CPTII,S$GLB,, | Performed by: PSYCHIATRY & NEUROLOGY

## 2022-08-16 PROCEDURE — 1160F RVW MEDS BY RX/DR IN RCRD: CPT | Mod: CPTII,S$GLB,, | Performed by: PSYCHIATRY & NEUROLOGY

## 2022-08-16 PROCEDURE — 3078F PR MOST RECENT DIASTOLIC BLOOD PRESSURE < 80 MM HG: ICD-10-PCS | Mod: CPTII,S$GLB,, | Performed by: PSYCHIATRY & NEUROLOGY

## 2022-08-16 PROCEDURE — 3074F SYST BP LT 130 MM HG: CPT | Mod: CPTII,S$GLB,, | Performed by: PSYCHIATRY & NEUROLOGY

## 2022-08-16 PROCEDURE — 90792 PSYCH DIAG EVAL W/MED SRVCS: CPT | Mod: S$GLB,,, | Performed by: PSYCHIATRY & NEUROLOGY

## 2022-08-16 PROCEDURE — 99999 PR PBB SHADOW E&M-EST. PATIENT-LVL III: CPT | Mod: PBBFAC,,, | Performed by: PSYCHIATRY & NEUROLOGY

## 2022-08-16 PROCEDURE — 3078F DIAST BP <80 MM HG: CPT | Mod: CPTII,S$GLB,, | Performed by: PSYCHIATRY & NEUROLOGY

## 2022-08-16 PROCEDURE — 3074F PR MOST RECENT SYSTOLIC BLOOD PRESSURE < 130 MM HG: ICD-10-PCS | Mod: CPTII,S$GLB,, | Performed by: PSYCHIATRY & NEUROLOGY

## 2022-08-16 PROCEDURE — 1160F PR REVIEW ALL MEDS BY PRESCRIBER/CLIN PHARMACIST DOCUMENTED: ICD-10-PCS | Mod: CPTII,S$GLB,, | Performed by: PSYCHIATRY & NEUROLOGY

## 2022-08-16 PROCEDURE — 3008F PR BODY MASS INDEX (BMI) DOCUMENTED: ICD-10-PCS | Mod: CPTII,S$GLB,, | Performed by: PSYCHIATRY & NEUROLOGY

## 2022-08-16 PROCEDURE — 3008F BODY MASS INDEX DOCD: CPT | Mod: CPTII,S$GLB,, | Performed by: PSYCHIATRY & NEUROLOGY

## 2022-08-16 PROCEDURE — 1159F PR MEDICATION LIST DOCUMENTED IN MEDICAL RECORD: ICD-10-PCS | Mod: CPTII,S$GLB,, | Performed by: PSYCHIATRY & NEUROLOGY

## 2022-08-16 PROCEDURE — 99999 PR PBB SHADOW E&M-EST. PATIENT-LVL III: ICD-10-PCS | Mod: PBBFAC,,, | Performed by: PSYCHIATRY & NEUROLOGY

## 2022-08-16 RX ORDER — LAMOTRIGINE 25 MG/1
25 TABLET ORAL DAILY
Qty: 42 TABLET | Refills: 0 | Status: SHIPPED | OUTPATIENT
Start: 2022-08-16 | End: 2023-01-18
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
## 2022-08-16 NOTE — PATIENT INSTRUCTIONS
Decrease lexapro from 10mg to 5mg daily for one week and then stop.  Continue wellbutrin XL 300mg  Continue seroquel 50mg nightly.   Start Lamictal 25mg after one week.  Referral for therapy  Labs  Find out past meds    Follow up in 3 weeks.

## 2022-09-05 PROBLEM — F60.3 BORDERLINE PERSONALITY DISORDER: Status: ACTIVE | Noted: 2022-09-05

## 2022-10-01 ENCOUNTER — OFFICE VISIT (OUTPATIENT)
Dept: URGENT CARE | Facility: CLINIC | Age: 22
End: 2022-10-01
Payer: COMMERCIAL

## 2022-10-01 VITALS
DIASTOLIC BLOOD PRESSURE: 62 MMHG | HEART RATE: 78 BPM | TEMPERATURE: 97 F | HEIGHT: 67 IN | RESPIRATION RATE: 14 BRPM | WEIGHT: 160 LBS | SYSTOLIC BLOOD PRESSURE: 105 MMHG | BODY MASS INDEX: 25.11 KG/M2 | OXYGEN SATURATION: 97 %

## 2022-10-01 DIAGNOSIS — J06.9 VIRAL URI: Primary | ICD-10-CM

## 2022-10-01 DIAGNOSIS — R09.81 NASAL CONGESTION: ICD-10-CM

## 2022-10-01 LAB
CTP QC/QA: YES
SARS-COV-2 RDRP RESP QL NAA+PROBE: NEGATIVE

## 2022-10-01 PROCEDURE — 3008F PR BODY MASS INDEX (BMI) DOCUMENTED: ICD-10-PCS | Mod: CPTII,S$GLB,, | Performed by: FAMILY MEDICINE

## 2022-10-01 PROCEDURE — 1160F PR REVIEW ALL MEDS BY PRESCRIBER/CLIN PHARMACIST DOCUMENTED: ICD-10-PCS | Mod: CPTII,S$GLB,, | Performed by: FAMILY MEDICINE

## 2022-10-01 PROCEDURE — 1159F MED LIST DOCD IN RCRD: CPT | Mod: CPTII,S$GLB,, | Performed by: FAMILY MEDICINE

## 2022-10-01 PROCEDURE — 99213 PR OFFICE/OUTPT VISIT, EST, LEVL III, 20-29 MIN: ICD-10-PCS | Mod: S$GLB,,, | Performed by: FAMILY MEDICINE

## 2022-10-01 PROCEDURE — U0002: ICD-10-PCS | Mod: QW,S$GLB,, | Performed by: FAMILY MEDICINE

## 2022-10-01 PROCEDURE — 3074F SYST BP LT 130 MM HG: CPT | Mod: CPTII,S$GLB,, | Performed by: FAMILY MEDICINE

## 2022-10-01 PROCEDURE — 3074F PR MOST RECENT SYSTOLIC BLOOD PRESSURE < 130 MM HG: ICD-10-PCS | Mod: CPTII,S$GLB,, | Performed by: FAMILY MEDICINE

## 2022-10-01 PROCEDURE — 99213 OFFICE O/P EST LOW 20 MIN: CPT | Mod: S$GLB,,, | Performed by: FAMILY MEDICINE

## 2022-10-01 PROCEDURE — 1160F RVW MEDS BY RX/DR IN RCRD: CPT | Mod: CPTII,S$GLB,, | Performed by: FAMILY MEDICINE

## 2022-10-01 PROCEDURE — 3008F BODY MASS INDEX DOCD: CPT | Mod: CPTII,S$GLB,, | Performed by: FAMILY MEDICINE

## 2022-10-01 PROCEDURE — 3078F DIAST BP <80 MM HG: CPT | Mod: CPTII,S$GLB,, | Performed by: FAMILY MEDICINE

## 2022-10-01 PROCEDURE — 3078F PR MOST RECENT DIASTOLIC BLOOD PRESSURE < 80 MM HG: ICD-10-PCS | Mod: CPTII,S$GLB,, | Performed by: FAMILY MEDICINE

## 2022-10-01 PROCEDURE — U0002 COVID-19 LAB TEST NON-CDC: HCPCS | Mod: QW,S$GLB,, | Performed by: FAMILY MEDICINE

## 2022-10-01 PROCEDURE — 1159F PR MEDICATION LIST DOCUMENTED IN MEDICAL RECORD: ICD-10-PCS | Mod: CPTII,S$GLB,, | Performed by: FAMILY MEDICINE

## 2022-10-01 RX ORDER — ESCITALOPRAM OXALATE 10 MG/1
10 TABLET ORAL DAILY
COMMUNITY
Start: 2022-08-22 | End: 2023-01-18

## 2022-10-01 NOTE — PROGRESS NOTES
"Subjective:       Patient ID: Laney Kaiser is a 22 y.o. female.    Vitals:  height is 5' 7" (1.702 m) and weight is 72.6 kg (160 lb). Her oral temperature is 97.1 °F (36.2 °C). Her blood pressure is 105/62 and her pulse is 78. Her respiration is 14 and oxygen saturation is 97%.     Chief Complaint: Sinus Problem    Patient c/o nasal congestion,sore throat and head pressure x3 days.Patient stated that she was emesis two days ago and still feels nausea today.Patient taking OTC medication mild relief.Denies fever or cough. Patient is requesting to be tested for COVID       Sinus Problem  This is a new problem. Episode onset: 3 days. The problem has been gradually worsening since onset. There has been no fever. Her pain is at a severity of 3/10. The pain is mild. Associated symptoms include congestion, headaches, neck pain, shortness of breath, sinus pressure, sneezing and a sore throat. Past treatments include oral decongestants. The treatment provided mild relief.     HENT:  Positive for congestion, sinus pressure and sore throat.    Neck: Positive for neck pain.   Respiratory:  Positive for shortness of breath.    Allergic/Immunologic: Positive for sneezing.   Neurological:  Positive for headaches.     Objective:      Physical Exam   Constitutional: She is oriented to person, place, and time. She appears well-developed. She is cooperative.  Non-toxic appearance. She does not appear ill. No distress.   HENT:   Head: Normocephalic and atraumatic.   Ears:   Right Ear: Hearing, tympanic membrane, external ear and ear canal normal.   Left Ear: Hearing, tympanic membrane, external ear and ear canal normal.   Nose: Nose normal. No mucosal edema, rhinorrhea or nasal deformity. No epistaxis. Right sinus exhibits no maxillary sinus tenderness and no frontal sinus tenderness. Left sinus exhibits no maxillary sinus tenderness and no frontal sinus tenderness.   Mouth/Throat: Uvula is midline, oropharynx is clear and " moist and mucous membranes are normal. No trismus in the jaw. Normal dentition. No uvula swelling. No oropharyngeal exudate, posterior oropharyngeal edema or posterior oropharyngeal erythema.   Eyes: Conjunctivae and lids are normal. No scleral icterus.   Neck: Trachea normal and phonation normal. Neck supple. No edema present. No erythema present. No neck rigidity present.   Cardiovascular: Normal rate, regular rhythm, normal heart sounds and normal pulses.   Pulmonary/Chest: Effort normal and breath sounds normal. No respiratory distress. She has no decreased breath sounds. She has no rhonchi.   Abdominal: Normal appearance.   Musculoskeletal: Normal range of motion.         General: No deformity. Normal range of motion.   Lymphadenopathy:     She has no cervical adenopathy.   Neurological: She is alert and oriented to person, place, and time. She exhibits normal muscle tone. Coordination normal.   Skin: Skin is warm, dry, intact, not diaphoretic and not pale.   Psychiatric: Her speech is normal and behavior is normal. Judgment and thought content normal.   Nursing note and vitals reviewed.      Assessment:       1. Viral URI    2. Nasal congestion          Plan:         Viral URI    Nasal congestion  -     POCT COVID-19 Rapid Screening     Pt or guardian provided educational materials and instructions regarding their visit diagnosis.

## 2022-10-18 ENCOUNTER — OFFICE VISIT (OUTPATIENT)
Dept: PSYCHIATRY | Facility: CLINIC | Age: 22
End: 2022-10-18
Payer: COMMERCIAL

## 2022-10-18 VITALS
DIASTOLIC BLOOD PRESSURE: 56 MMHG | SYSTOLIC BLOOD PRESSURE: 112 MMHG | WEIGHT: 163.56 LBS | HEART RATE: 79 BPM | BODY MASS INDEX: 25.62 KG/M2

## 2022-10-18 DIAGNOSIS — F60.3 BORDERLINE PERSONALITY DISORDER: ICD-10-CM

## 2022-10-18 DIAGNOSIS — F41.1 GAD (GENERALIZED ANXIETY DISORDER): ICD-10-CM

## 2022-10-18 DIAGNOSIS — F43.10 PTSD (POST-TRAUMATIC STRESS DISORDER): ICD-10-CM

## 2022-10-18 DIAGNOSIS — F31.81 BIPOLAR 2 DISORDER: Primary | ICD-10-CM

## 2022-10-18 PROCEDURE — 99999 PR PBB SHADOW E&M-EST. PATIENT-LVL III: ICD-10-PCS | Mod: PBBFAC,,, | Performed by: PSYCHIATRY & NEUROLOGY

## 2022-10-18 PROCEDURE — 3078F PR MOST RECENT DIASTOLIC BLOOD PRESSURE < 80 MM HG: ICD-10-PCS | Mod: CPTII,S$GLB,, | Performed by: PSYCHIATRY & NEUROLOGY

## 2022-10-18 PROCEDURE — 1160F RVW MEDS BY RX/DR IN RCRD: CPT | Mod: CPTII,S$GLB,, | Performed by: PSYCHIATRY & NEUROLOGY

## 2022-10-18 PROCEDURE — 1159F MED LIST DOCD IN RCRD: CPT | Mod: CPTII,S$GLB,, | Performed by: PSYCHIATRY & NEUROLOGY

## 2022-10-18 PROCEDURE — 99999 PR PBB SHADOW E&M-EST. PATIENT-LVL III: CPT | Mod: PBBFAC,,, | Performed by: PSYCHIATRY & NEUROLOGY

## 2022-10-18 PROCEDURE — 1159F PR MEDICATION LIST DOCUMENTED IN MEDICAL RECORD: ICD-10-PCS | Mod: CPTII,S$GLB,, | Performed by: PSYCHIATRY & NEUROLOGY

## 2022-10-18 PROCEDURE — 99214 OFFICE O/P EST MOD 30 MIN: CPT | Mod: S$GLB,,, | Performed by: PSYCHIATRY & NEUROLOGY

## 2022-10-18 PROCEDURE — 3078F DIAST BP <80 MM HG: CPT | Mod: CPTII,S$GLB,, | Performed by: PSYCHIATRY & NEUROLOGY

## 2022-10-18 PROCEDURE — 99214 PR OFFICE/OUTPT VISIT, EST, LEVL IV, 30-39 MIN: ICD-10-PCS | Mod: S$GLB,,, | Performed by: PSYCHIATRY & NEUROLOGY

## 2022-10-18 PROCEDURE — 3074F PR MOST RECENT SYSTOLIC BLOOD PRESSURE < 130 MM HG: ICD-10-PCS | Mod: CPTII,S$GLB,, | Performed by: PSYCHIATRY & NEUROLOGY

## 2022-10-18 PROCEDURE — 3074F SYST BP LT 130 MM HG: CPT | Mod: CPTII,S$GLB,, | Performed by: PSYCHIATRY & NEUROLOGY

## 2022-10-18 PROCEDURE — 1160F PR REVIEW ALL MEDS BY PRESCRIBER/CLIN PHARMACIST DOCUMENTED: ICD-10-PCS | Mod: CPTII,S$GLB,, | Performed by: PSYCHIATRY & NEUROLOGY

## 2022-10-18 NOTE — PATIENT INSTRUCTIONS
Decrease lexapro from 10mg to 5mg daily for one week and then stop.  Start lamictal at 25mg daily for two weeks, then increase to 50mg daily for another two weeks. Please contact Dr. Hughes if you see any new rashes when starting this medication.   Continue Wellbutrin XL 300mg daily  Continue seroquel 50mg nightly.  Find a therapist    Follow up 4 weeks.    Psychotherapy:     [facility] ([facility])   Call to make an appointment within [facility] for psychiatry/psychology [phone].     [facility]   [email or web address]   [email or web address]   Options for Behavioral Health.     [facility]   [facility]: [phone]       [facility]   Cognitive Behavioral Therapy (CBT) [facility]   Address: [facility] [place] [address]   Phone: [phone]   [email or web address]       Integrated [facility]   Address:  [address] [place] [address]   Phone: [phone]   You can email for an appointment at: [email or web address]       [facility]   Address: [address] [facility], [place] [address], [facility], [facility]   [email or web address]   Dr. Erica Biggs, [phone] or [email or web address]   Dr. Maria Lindsay, [phone] or [email or web address]     DBT   [facility] [email or web address]   [facility] [email or web address]     [facility]   [email or web address]     [facility]   [email or web address]     [facility]   [email or web address]     [facility]   [email or web address].com/us - Search for a therapist in your area based on your insurance coverage and provider expertise.       Employee Assistance Program (EAP)   Check through your employer's HR.       Online Therapist:      https://[email or web address].[employer]/health/mental-health/online-psychiatrist#A-quick-look-at-the-top-szbaca-suaogknihk-howqyqop-in-2021     Free Guided Meditations   [email or web address]/audio   Https://[email or web address]   Https://health.[facility]/specialties/mindfulness/programs/mbsr/pages/audio.aspx       Crisis Support:     If you are having an emergency, please call 911 or visit your nearest Emergency Department.     Local Hotlines:   [place] Crisis Line   (206) 030-ZNOJ   Free, confidential crisis counseling, suicide prevention, and information and referral services 24/7.     [facility] Crisis Response Team   [phone]   Team is available 24/7 in [facility].     [facility] Services [facility]   [phone] or [phone]   Crisis line for [facility].     Crisis Transportation Service ([facility])   Call 911 and ask for a crisis unit.  There are two teams available (between 12 pm - 12 am) to provide crisis intervention and transportation to local hospitals.  This service is offered jointly by [facility] and the Office of Health and Hospitals.     [facility]   [email or web address]   Options for Urgent Care.     National Hotlines:   National Suicide Prevention Lifeline   (340) 630-1708   Free and confidential 24/7 support, information, and resources.     ANDREA   (671) 504-HEGG   This line can be reached Monday through Friday, 9 am to 5 pm CST.     Crisis Call Center   [phone]   Call the Crisis Call Center or text CARE to 167670 for 24/7/365 crisis support.     Suicide.org   (641) 520-6873   Call the hotline to receive 24/7 crisis support and to learn about resources in your area.     Crisis Text Line (volunteer help)   Text HOME to 297056   Crisis counselors are trained volunteers and not professionals, and can provide support but not medical advice.

## 2022-12-11 ENCOUNTER — OFFICE VISIT (OUTPATIENT)
Dept: URGENT CARE | Facility: CLINIC | Age: 22
End: 2022-12-11
Payer: COMMERCIAL

## 2022-12-11 VITALS
TEMPERATURE: 98 F | WEIGHT: 160 LBS | SYSTOLIC BLOOD PRESSURE: 108 MMHG | HEART RATE: 103 BPM | RESPIRATION RATE: 18 BRPM | BODY MASS INDEX: 25.11 KG/M2 | DIASTOLIC BLOOD PRESSURE: 78 MMHG | OXYGEN SATURATION: 98 % | HEIGHT: 67 IN

## 2022-12-11 DIAGNOSIS — R09.81 NASAL CONGESTION: Primary | ICD-10-CM

## 2022-12-11 DIAGNOSIS — Z20.822 EXPOSURE TO COVID-19 VIRUS: ICD-10-CM

## 2022-12-11 LAB
CTP QC/QA: YES
SARS-COV-2 AG RESP QL IA.RAPID: NEGATIVE

## 2022-12-11 PROCEDURE — 87811 SARS CORONAVIRUS 2 ANTIGEN POCT, MANUAL READ: ICD-10-PCS | Mod: QW,S$GLB,, | Performed by: NURSE PRACTITIONER

## 2022-12-11 PROCEDURE — 3078F DIAST BP <80 MM HG: CPT | Mod: CPTII,S$GLB,, | Performed by: NURSE PRACTITIONER

## 2022-12-11 PROCEDURE — 1159F MED LIST DOCD IN RCRD: CPT | Mod: CPTII,S$GLB,, | Performed by: NURSE PRACTITIONER

## 2022-12-11 PROCEDURE — 99213 PR OFFICE/OUTPT VISIT, EST, LEVL III, 20-29 MIN: ICD-10-PCS | Mod: S$GLB,,, | Performed by: NURSE PRACTITIONER

## 2022-12-11 PROCEDURE — 1160F RVW MEDS BY RX/DR IN RCRD: CPT | Mod: CPTII,S$GLB,, | Performed by: NURSE PRACTITIONER

## 2022-12-11 PROCEDURE — 3074F PR MOST RECENT SYSTOLIC BLOOD PRESSURE < 130 MM HG: ICD-10-PCS | Mod: CPTII,S$GLB,, | Performed by: NURSE PRACTITIONER

## 2022-12-11 PROCEDURE — 3074F SYST BP LT 130 MM HG: CPT | Mod: CPTII,S$GLB,, | Performed by: NURSE PRACTITIONER

## 2022-12-11 PROCEDURE — 3078F PR MOST RECENT DIASTOLIC BLOOD PRESSURE < 80 MM HG: ICD-10-PCS | Mod: CPTII,S$GLB,, | Performed by: NURSE PRACTITIONER

## 2022-12-11 PROCEDURE — 87811 SARS-COV-2 COVID19 W/OPTIC: CPT | Mod: QW,S$GLB,, | Performed by: NURSE PRACTITIONER

## 2022-12-11 PROCEDURE — 3008F PR BODY MASS INDEX (BMI) DOCUMENTED: ICD-10-PCS | Mod: CPTII,S$GLB,, | Performed by: NURSE PRACTITIONER

## 2022-12-11 PROCEDURE — 3008F BODY MASS INDEX DOCD: CPT | Mod: CPTII,S$GLB,, | Performed by: NURSE PRACTITIONER

## 2022-12-11 PROCEDURE — 1159F PR MEDICATION LIST DOCUMENTED IN MEDICAL RECORD: ICD-10-PCS | Mod: CPTII,S$GLB,, | Performed by: NURSE PRACTITIONER

## 2022-12-11 PROCEDURE — 99213 OFFICE O/P EST LOW 20 MIN: CPT | Mod: S$GLB,,, | Performed by: NURSE PRACTITIONER

## 2022-12-11 PROCEDURE — 1160F PR REVIEW ALL MEDS BY PRESCRIBER/CLIN PHARMACIST DOCUMENTED: ICD-10-PCS | Mod: CPTII,S$GLB,, | Performed by: NURSE PRACTITIONER

## 2022-12-11 NOTE — PATIENT INSTRUCTIONS
Reviewed negative COVID-19 virus test with patient who verbalized understanding.  Advised patient that her symptoms are indicative of an upper respiratory infection which is viral in nature and should be treated symptomatically.  We discussed over-the-counter medications as well as home remedies to help with current symptoms.    Patient educational handouts also included in discharge paperwork for patient who verbalized understanding agrees with plan of care.  She denies any further questions or concerns at this time.  Patient exits exam room in no acute distress.     Please drink plenty of fluids.  Please get plenty of rest.  Please return here or go to the Emergency Department for any concerns or worsening of condition.  If you do not have Hypertension or any history of palpitations, it is ok to take over the counter Sudafed or Mucinex D or Allegra-D or Claritin-D or Zyrtec-D.  If you do take one of the above, it is ok to combine that with plain over the counter Mucinex or Allegra or Claritin or Zyrtec.  If for example you are taking Zyrtec -D, you can combine that with Mucinex, but not Mucinex-D.  If you are taking Mucinex-D, you can combine that with plain Allegra or Claritin or Zyrtec.   If you do have Hypertension or palpitations, it is safe to take Coricidin HBP for relief of sinus symptoms.  We recommend you take over the counter Flonase (Fluticasone) or another nasally inhaled steroid unless you are already taking one.  Nasal irrigation with a saline spray or Netti Pot like device per their directions is also recommended.  If not allergic, please take over the counter Tylenol (Acetaminophen) and/or Motrin (Ibuprofen) as directed for control of pain and/or fever.  Please follow up with your primary care doctor or specialist as needed.    If you  smoke, please stop smoking.

## 2022-12-11 NOTE — PROGRESS NOTES
"Subjective:       Patient ID: Laney Kaiser is a 22 y.o. female.    Vitals:  height is 5' 7" (1.702 m) and weight is 72.6 kg (160 lb). Her oral temperature is 98.2 °F (36.8 °C). Her blood pressure is 108/78 and her pulse is 103. Her respiration is 18 and oxygen saturation is 98%.     Chief Complaint: Nasal Congestion    Patient c/o nasal congestion x3 days. Patient states that boyfriend tested positive for covid.OTC medication for congestion     22-year-old female presents to clinic with complaints of covid exposure and nasal congestion. History positive for covid infection x 3.      Other  This is a new problem. Episode onset: 3 days ago. The problem occurs constantly. The problem has been unchanged. Associated symptoms include congestion. Pertinent negatives include no abdominal pain, chills, coughing, diaphoresis, fatigue, fever, myalgias, nausea or vomiting. Nothing aggravates the symptoms. The treatment provided mild relief.     Constitution: Negative for chills, sweating, fatigue and fever.   HENT:  Positive for congestion and postnasal drip. Negative for sinus pain and sinus pressure.    Respiratory:  Negative for cough.    Gastrointestinal:  Negative for abdominal pain, nausea and vomiting.   Musculoskeletal:  Negative for muscle ache.   Allergic/Immunologic: Positive for seasonal allergies.     Objective:      Physical Exam   Constitutional: She is oriented to person, place, and time. She appears well-developed. She is cooperative.  Non-toxic appearance. She does not appear ill. No distress.   HENT:   Head: Normocephalic and atraumatic.   Ears:   Right Ear: Hearing, tympanic membrane, external ear and ear canal normal.   Left Ear: Hearing, tympanic membrane, external ear and ear canal normal.   Nose: Nose normal. No mucosal edema, rhinorrhea or nasal deformity. No epistaxis. Right sinus exhibits no maxillary sinus tenderness and no frontal sinus tenderness. Left sinus exhibits no maxillary sinus " tenderness and no frontal sinus tenderness.   Mouth/Throat: Uvula is midline, oropharynx is clear and moist and mucous membranes are normal. No trismus in the jaw. Normal dentition. No uvula swelling. No oropharyngeal exudate, posterior oropharyngeal edema or posterior oropharyngeal erythema.   Eyes: Conjunctivae and lids are normal. No scleral icterus.   Neck: Trachea normal and phonation normal. Neck supple. No edema present. No erythema present. No neck rigidity present.   Cardiovascular: Regular rhythm, normal heart sounds and normal pulses. Tachycardia present.   Pulmonary/Chest: Effort normal and breath sounds normal. No respiratory distress. She has no decreased breath sounds. She has no rhonchi.   Abdominal: Normal appearance.   Musculoskeletal: Normal range of motion.         General: No deformity. Normal range of motion.   Neurological: She is alert and oriented to person, place, and time. She exhibits normal muscle tone. Coordination normal.   Skin: Skin is warm, dry, intact, not diaphoretic and not pale.   Psychiatric: Her speech is normal and behavior is normal. Judgment and thought content normal.   Nursing note and vitals reviewed.      Assessment:       1. Nasal congestion    2. Exposure to COVID-19 virus        Results for orders placed or performed in visit on 12/11/22   SARS Coronavirus 2 Antigen, POCT Manual Read   Result Value Ref Range    SARS Coronavirus 2 Antigen Negative Negative     Acceptable Yes       Plan:         Nasal congestion  -     SARS Coronavirus 2 Antigen, POCT Manual Read    Exposure to COVID-19 virus         Patient Instructions   Reviewed negative COVID-19 virus test with patient who verbalized understanding.  Advised patient that her symptoms are indicative of an upper respiratory infection which is viral in nature and should be treated symptomatically.  We discussed over-the-counter medications as well as home remedies to help with current symptoms.     Patient educational handouts also included in discharge paperwork for patient who verbalized understanding agrees with plan of care.  She denies any further questions or concerns at this time.  Patient exits exam room in no acute distress.     Please drink plenty of fluids.  Please get plenty of rest.  Please return here or go to the Emergency Department for any concerns or worsening of condition.  If you do not have Hypertension or any history of palpitations, it is ok to take over the counter Sudafed or Mucinex D or Allegra-D or Claritin-D or Zyrtec-D.  If you do take one of the above, it is ok to combine that with plain over the counter Mucinex or Allegra or Claritin or Zyrtec.  If for example you are taking Zyrtec -D, you can combine that with Mucinex, but not Mucinex-D.  If you are taking Mucinex-D, you can combine that with plain Allegra or Claritin or Zyrtec.   If you do have Hypertension or palpitations, it is safe to take Coricidin HBP for relief of sinus symptoms.  We recommend you take over the counter Flonase (Fluticasone) or another nasally inhaled steroid unless you are already taking one.  Nasal irrigation with a saline spray or Netti Pot like device per their directions is also recommended.  If not allergic, please take over the counter Tylenol (Acetaminophen) and/or Motrin (Ibuprofen) as directed for control of pain and/or fever.  Please follow up with your primary care doctor or specialist as needed.    If you  smoke, please stop smoking.

## 2022-12-17 ENCOUNTER — OFFICE VISIT (OUTPATIENT)
Dept: URGENT CARE | Facility: CLINIC | Age: 22
End: 2022-12-17
Payer: COMMERCIAL

## 2022-12-17 VITALS
OXYGEN SATURATION: 98 % | HEART RATE: 79 BPM | RESPIRATION RATE: 18 BRPM | WEIGHT: 160 LBS | TEMPERATURE: 99 F | BODY MASS INDEX: 25.11 KG/M2 | SYSTOLIC BLOOD PRESSURE: 97 MMHG | HEIGHT: 67 IN | DIASTOLIC BLOOD PRESSURE: 68 MMHG

## 2022-12-17 DIAGNOSIS — R52 BODY ACHES: ICD-10-CM

## 2022-12-17 DIAGNOSIS — R09.81 NASAL CONGESTION: ICD-10-CM

## 2022-12-17 DIAGNOSIS — J01.00 ACUTE NON-RECURRENT MAXILLARY SINUSITIS: Primary | ICD-10-CM

## 2022-12-17 LAB
CTP QC/QA: YES
POC MOLECULAR INFLUENZA A AGN: NEGATIVE
POC MOLECULAR INFLUENZA B AGN: NEGATIVE

## 2022-12-17 PROCEDURE — 99213 PR OFFICE/OUTPT VISIT, EST, LEVL III, 20-29 MIN: ICD-10-PCS | Mod: S$GLB,,, | Performed by: PHYSICIAN ASSISTANT

## 2022-12-17 PROCEDURE — 3008F BODY MASS INDEX DOCD: CPT | Mod: CPTII,S$GLB,, | Performed by: PHYSICIAN ASSISTANT

## 2022-12-17 PROCEDURE — 87502 POCT INFLUENZA A/B MOLECULAR: ICD-10-PCS | Mod: QW,S$GLB,, | Performed by: PHYSICIAN ASSISTANT

## 2022-12-17 PROCEDURE — 3008F PR BODY MASS INDEX (BMI) DOCUMENTED: ICD-10-PCS | Mod: CPTII,S$GLB,, | Performed by: PHYSICIAN ASSISTANT

## 2022-12-17 PROCEDURE — 3074F PR MOST RECENT SYSTOLIC BLOOD PRESSURE < 130 MM HG: ICD-10-PCS | Mod: CPTII,S$GLB,, | Performed by: PHYSICIAN ASSISTANT

## 2022-12-17 PROCEDURE — 3078F DIAST BP <80 MM HG: CPT | Mod: CPTII,S$GLB,, | Performed by: PHYSICIAN ASSISTANT

## 2022-12-17 PROCEDURE — 3074F SYST BP LT 130 MM HG: CPT | Mod: CPTII,S$GLB,, | Performed by: PHYSICIAN ASSISTANT

## 2022-12-17 PROCEDURE — 1159F PR MEDICATION LIST DOCUMENTED IN MEDICAL RECORD: ICD-10-PCS | Mod: CPTII,S$GLB,, | Performed by: PHYSICIAN ASSISTANT

## 2022-12-17 PROCEDURE — 3078F PR MOST RECENT DIASTOLIC BLOOD PRESSURE < 80 MM HG: ICD-10-PCS | Mod: CPTII,S$GLB,, | Performed by: PHYSICIAN ASSISTANT

## 2022-12-17 PROCEDURE — 1160F PR REVIEW ALL MEDS BY PRESCRIBER/CLIN PHARMACIST DOCUMENTED: ICD-10-PCS | Mod: CPTII,S$GLB,, | Performed by: PHYSICIAN ASSISTANT

## 2022-12-17 PROCEDURE — 99213 OFFICE O/P EST LOW 20 MIN: CPT | Mod: S$GLB,,, | Performed by: PHYSICIAN ASSISTANT

## 2022-12-17 PROCEDURE — 87502 INFLUENZA DNA AMP PROBE: CPT | Mod: QW,S$GLB,, | Performed by: PHYSICIAN ASSISTANT

## 2022-12-17 PROCEDURE — 1159F MED LIST DOCD IN RCRD: CPT | Mod: CPTII,S$GLB,, | Performed by: PHYSICIAN ASSISTANT

## 2022-12-17 PROCEDURE — 1160F RVW MEDS BY RX/DR IN RCRD: CPT | Mod: CPTII,S$GLB,, | Performed by: PHYSICIAN ASSISTANT

## 2022-12-17 RX ORDER — AMOXICILLIN AND CLAVULANATE POTASSIUM 875; 125 MG/1; MG/1
1 TABLET, FILM COATED ORAL EVERY 12 HOURS
Qty: 20 TABLET | Refills: 0 | Status: SHIPPED | OUTPATIENT
Start: 2022-12-17 | End: 2022-12-27

## 2022-12-17 RX ORDER — FLUTICASONE PROPIONATE 50 MCG
1 SPRAY, SUSPENSION (ML) NASAL DAILY
Qty: 16 ML | Refills: 0 | Status: SHIPPED | OUTPATIENT
Start: 2022-12-17 | End: 2022-12-24

## 2022-12-17 NOTE — PATIENT INSTRUCTIONS
Use augmentin with food as prescribed. Flonase for nasal congestion. Tylenol for fever/pain. Follow up as needed

## 2022-12-17 NOTE — PROGRESS NOTES
"Subjective:       Patient ID: Laney Kaiser is a 22 y.o. female.    Vitals:  height is 5' 7" (1.702 m) and weight is 72.6 kg (160 lb). Her temperature is 98.5 °F (36.9 °C). Her blood pressure is 97/68 and her pulse is 79. Her respiration is 18 and oxygen saturation is 98%.     Chief Complaint: Nasal Congestion    22 y.o. female presents to clinic with complaints of worsening nasal congestion. Pt was examined and treated in clinic on 12/11 with same symptoms. Pt requesting     Other  This is a recurrent problem. The current episode started in the past 7 days (12/8). The problem occurs constantly. The problem has been gradually worsening. Associated symptoms include congestion. Pertinent negatives include no abdominal pain, anorexia, arthralgias, change in bowel habit, chest pain, chills, coughing, diaphoresis, fatigue, fever, headaches, joint swelling, myalgias, nausea, neck pain, numbness, rash, sore throat, swollen glands, urinary symptoms, vertigo, visual change, vomiting or weakness. Nothing aggravates the symptoms. Treatments tried: Mucinex.     Constitution: Negative for appetite change, chills, sweating, fatigue, fever and unexpected weight change.   HENT:  Positive for congestion, postnasal drip and sinus pressure. Negative for ear pain, ear discharge, dental problem, drooling, mouth sores, tongue pain, tongue lesion, sinus pain, sore throat and trouble swallowing.    Neck: Negative for neck pain, neck stiffness and painful lymph nodes.   Cardiovascular:  Negative for chest pain and sob on exertion.   Eyes:  Negative for eye discharge and eye itching.   Respiratory:  Negative for cough, sputum production and shortness of breath.    Gastrointestinal:  Negative for abdominal pain, nausea, vomiting, constipation and diarrhea.   Musculoskeletal:  Negative for joint pain, joint swelling and muscle ache.   Skin:  Negative for color change, pale and rash.   Neurological:  Negative for dizziness, history of " vertigo, headaches, disorientation, altered mental status, numbness and tingling.   Hematologic/Lymphatic: Negative for swollen lymph nodes.   Psychiatric/Behavioral:  Negative for altered mental status, disorientation, confusion and agitation.    Past Medical History:   Diagnosis Date    Acute deep vein thrombosis (DVT) of left upper extremity     Bipolar disorder     Factor V Leiden mutation     HOMOZYGOUS    History of blood clots        Past Surgical History:   Procedure Laterality Date    DIAGNOSTIC LAPAROSCOPY  2017    TONSILLECTOMY      WISDOM TOOTH EXTRACTION         Family History   Problem Relation Age of Onset    Colon cancer Maternal Grandmother     Breast cancer Neg Hx     Ovarian cancer Neg Hx        Social History     Socioeconomic History    Marital status: Single   Tobacco Use    Smoking status: Never    Smokeless tobacco: Never   Substance and Sexual Activity    Alcohol use: Yes     Comment: occ.    Drug use: Never    Sexual activity: Not Currently     Partners: Female, Male       Current Outpatient Medications   Medication Sig Dispense Refill    buPROPion (WELLBUTRIN) 100 MG tablet Take 300 mg by mouth once daily.      EScitalopram oxalate (LEXAPRO) 10 MG tablet Take 10 mg by mouth once daily.      fluticasone propionate (FLONASE) 50 mcg/actuation nasal spray 1 spray (50 mcg total) by Each Nostril route once daily. for 7 days 16 mL 0    lamoTRIgine (LAMICTAL) 25 MG tablet Take 1 tablet (25 mg total) by mouth once daily. 42 tablet 0    levonorgestreL (KYLEENA) 17.5 mcg/24 hrs (5 yrs) 19.5 mg IUD 1 each by Intrauterine route.      QUEtiapine (SEROQUEL) 50 MG tablet Take 50 mg by mouth nightly.       No current facility-administered medications for this visit.       Review of patient's allergies indicates:  No Known Allergies      Objective:      Physical Exam   Constitutional: She is oriented to person, place, and time. She appears well-developed. She is cooperative.  Non-toxic appearance. She does  not appear ill. No distress.   HENT:   Head: Normocephalic and atraumatic.   Ears:   Right Ear: Hearing, external ear and ear canal normal. Tympanic membrane is not erythematous, not retracted and not bulging. A middle ear effusion is present. impacted cerumen  Left Ear: Hearing, external ear and ear canal normal. Tympanic membrane is not erythematous, not retracted and not bulging. A middle ear effusion is present. impacted cerumen  Nose: Rhinorrhea and congestion present. No mucosal edema or nasal deformity. No epistaxis. Right sinus exhibits no maxillary sinus tenderness and no frontal sinus tenderness. Left sinus exhibits no maxillary sinus tenderness and no frontal sinus tenderness.   Mouth/Throat: Uvula is midline and mucous membranes are normal. Mucous membranes are moist. No trismus in the jaw. Normal dentition. No uvula swelling. Posterior oropharyngeal erythema present. No oropharyngeal exudate or posterior oropharyngeal edema.   Eyes: Conjunctivae and lids are normal. Right eye exhibits no discharge. Left eye exhibits no discharge. No scleral icterus.   Neck: Trachea normal and phonation normal. Neck supple. No edema present. No erythema present. No neck rigidity present.   Cardiovascular: Normal rate, regular rhythm, normal heart sounds and normal pulses.   No murmur heard.Exam reveals no gallop.   Pulmonary/Chest: Effort normal and breath sounds normal. No stridor. No respiratory distress. She has no decreased breath sounds. She has no wheezes. She has no rhonchi. She has no rales.   Abdominal: Normal appearance.   Musculoskeletal:      Cervical back: She exhibits no tenderness.   Lymphadenopathy:     She has no cervical adenopathy.   Neurological: She is alert and oriented to person, place, and time. She exhibits normal muscle tone. Coordination normal.   Skin: Skin is warm, dry, intact, not diaphoretic, not pale and no rash.   Psychiatric: Her speech is normal and behavior is normal. Judgment and  thought content normal.   Nursing note and vitals reviewed.  Results for orders placed or performed in visit on 12/17/22   POCT Influenza A/B MOLECULAR   Result Value Ref Range    POC Molecular Influenza A Ag Negative Negative, Not Reported    POC Molecular Influenza B Ag Negative Negative, Not Reported     Acceptable Yes      '      Assessment:       1. Acute non-recurrent maxillary sinusitis    2. Body aches    3. Nasal congestion          Plan:     Results reviewed    Acute non-recurrent maxillary sinusitis  -     amoxicillin-clavulanate 875-125mg (AUGMENTIN) 875-125 mg per tablet; Take 1 tablet by mouth every 12 (twelve) hours. for 10 days  Dispense: 20 tablet; Refill: 0    Body aches  -     POCT Influenza A/B MOLECULAR    Nasal congestion  -     fluticasone propionate (FLONASE) 50 mcg/actuation nasal spray; 1 spray (50 mcg total) by Each Nostril route once daily. for 7 days  Dispense: 16 mL; Refill: 0

## 2022-12-17 NOTE — LETTER
December 17, 2022      Urgent [facility] [address] [address]  Phone: [phone]  Fax: [phone]       Patient: Laney Kaiser   YOB: 2000  Date of Visit: 12/17/2022    To Whom It May Concern:    Danielle Kaiser  was at [facility] on 12/17/2022. The patient may return to work on 12/17/2022 with no restrictions. Patient is negative for the flu.  If you have any questions or concerns, or if I can be of further assistance, please do not hesitate to contact me.    Sincerely,      Senait Stanton PA-C

## 2023-01-18 ENCOUNTER — OFFICE VISIT (OUTPATIENT)
Dept: PSYCHIATRY | Facility: CLINIC | Age: 23
End: 2023-01-18
Payer: COMMERCIAL

## 2023-01-18 VITALS
TEMPERATURE: 97 F | WEIGHT: 160.94 LBS | BODY MASS INDEX: 25.26 KG/M2 | DIASTOLIC BLOOD PRESSURE: 58 MMHG | SYSTOLIC BLOOD PRESSURE: 115 MMHG | HEIGHT: 67 IN | HEART RATE: 88 BPM

## 2023-01-18 DIAGNOSIS — F60.3 BORDERLINE PERSONALITY DISORDER: ICD-10-CM

## 2023-01-18 DIAGNOSIS — F43.10 PTSD (POST-TRAUMATIC STRESS DISORDER): ICD-10-CM

## 2023-01-18 DIAGNOSIS — F31.81 BIPOLAR 2 DISORDER: Primary | ICD-10-CM

## 2023-01-18 DIAGNOSIS — F41.1 GAD (GENERALIZED ANXIETY DISORDER): ICD-10-CM

## 2023-01-18 PROCEDURE — 1160F RVW MEDS BY RX/DR IN RCRD: CPT | Mod: CPTII,S$GLB,, | Performed by: PSYCHIATRY & NEUROLOGY

## 2023-01-18 PROCEDURE — 1159F MED LIST DOCD IN RCRD: CPT | Mod: CPTII,S$GLB,, | Performed by: PSYCHIATRY & NEUROLOGY

## 2023-01-18 PROCEDURE — 1159F PR MEDICATION LIST DOCUMENTED IN MEDICAL RECORD: ICD-10-PCS | Mod: CPTII,S$GLB,, | Performed by: PSYCHIATRY & NEUROLOGY

## 2023-01-18 PROCEDURE — 3078F PR MOST RECENT DIASTOLIC BLOOD PRESSURE < 80 MM HG: ICD-10-PCS | Mod: CPTII,S$GLB,, | Performed by: PSYCHIATRY & NEUROLOGY

## 2023-01-18 PROCEDURE — 99999 PR PBB SHADOW E&M-EST. PATIENT-LVL III: ICD-10-PCS | Mod: PBBFAC,,, | Performed by: PSYCHIATRY & NEUROLOGY

## 2023-01-18 PROCEDURE — 3008F PR BODY MASS INDEX (BMI) DOCUMENTED: ICD-10-PCS | Mod: CPTII,S$GLB,, | Performed by: PSYCHIATRY & NEUROLOGY

## 2023-01-18 PROCEDURE — 3078F DIAST BP <80 MM HG: CPT | Mod: CPTII,S$GLB,, | Performed by: PSYCHIATRY & NEUROLOGY

## 2023-01-18 PROCEDURE — 3074F SYST BP LT 130 MM HG: CPT | Mod: CPTII,S$GLB,, | Performed by: PSYCHIATRY & NEUROLOGY

## 2023-01-18 PROCEDURE — 99999 PR PBB SHADOW E&M-EST. PATIENT-LVL III: CPT | Mod: PBBFAC,,, | Performed by: PSYCHIATRY & NEUROLOGY

## 2023-01-18 PROCEDURE — 1160F PR REVIEW ALL MEDS BY PRESCRIBER/CLIN PHARMACIST DOCUMENTED: ICD-10-PCS | Mod: CPTII,S$GLB,, | Performed by: PSYCHIATRY & NEUROLOGY

## 2023-01-18 PROCEDURE — 3074F PR MOST RECENT SYSTOLIC BLOOD PRESSURE < 130 MM HG: ICD-10-PCS | Mod: CPTII,S$GLB,, | Performed by: PSYCHIATRY & NEUROLOGY

## 2023-01-18 PROCEDURE — 99214 PR OFFICE/OUTPT VISIT, EST, LEVL IV, 30-39 MIN: ICD-10-PCS | Mod: S$GLB,,, | Performed by: PSYCHIATRY & NEUROLOGY

## 2023-01-18 PROCEDURE — 3008F BODY MASS INDEX DOCD: CPT | Mod: CPTII,S$GLB,, | Performed by: PSYCHIATRY & NEUROLOGY

## 2023-01-18 PROCEDURE — 99214 OFFICE O/P EST MOD 30 MIN: CPT | Mod: S$GLB,,, | Performed by: PSYCHIATRY & NEUROLOGY

## 2023-01-18 RX ORDER — PRAZOSIN HYDROCHLORIDE 1 MG/1
1 CAPSULE ORAL NIGHTLY
Qty: 30 CAPSULE | Refills: 2 | Status: SHIPPED | OUTPATIENT
Start: 2023-01-18 | End: 2023-02-22

## 2023-01-18 NOTE — PROGRESS NOTES
"Subsequent Psychiatric Session    22 y.o. female    Reason for Follow Up:   1. Bipolar 2 disorder    2. PTSD (post-traumatic stress disorder)    3. TEDDY (generalized anxiety disorder)    4. Borderline personality disorder            Current Medications:    Current Outpatient Medications:     buPROPion (WELLBUTRIN) 100 MG tablet, Take 300 mg by mouth once daily., Disp: , Rfl:     levonorgestreL (KYLEENA) 17.5 mcg/24 hrs (5 yrs) 19.5 mg IUD, 1 each by Intrauterine route., Disp: , Rfl:     QUEtiapine (SEROQUEL) 50 MG tablet, Take 50 mg by mouth nightly., Disp: , Rfl:     prazosin (MINIPRESS) 1 MG Cap, Take 1 capsule (1 mg total) by mouth every evening., Disp: 30 capsule, Rfl: 2     Date of Last Visit:   10/18/2022    In Clinic Since:   August 2022  Therapy:    [facility]    Interval History  She stayed on Wellbutrin XL 300mg and went back up to 10mg of lexapro. She was scared of weight gain with lamictal. She feels "super depressed before my period." She has panic attacks during sex, flashbacks. She has been putting off gynecology visit due to trauma symptoms. She would like to try another medication for sleep. She would like something to help with acute anxiety before OB/GYN visits. She is interested in being evaluated for ADHD.     Denies medication side effects. She has had passive SI without intent or plan since last session. Denies active SI, HI, AVH.    Manic/Hypomanic Symptom Screening:  Since the last session, have you had any periods lasting at least a few days where your energy level was higher than normal and you did not need as much sleep at night to function the following day?: No    Substance Use Screening:  Not asked this session    Review of Measures  PHQ-9: 19  TEDDY-7: 8    Scores from last session:   PHQ-9: 13  TEDDY-7: 10    Scores from initial session:  PHQ9 Score:              16/27  GAD7 Score:              10/21       Review of PDMP  Reviewed this session     Constitutional     VITAL SIGNS  Temp " 97.2 °F (36.2 °C)   BP (!) 115/58    HR 88    RR      SpO2           Office Visit on 12/17/2022   Component Date Value Ref Range Status    POC Molecular Influenza A Ag 12/17/2022 Negative  Negative, Not Reported Final    POC Molecular Influenza B Ag 12/17/2022 Negative  Negative, Not Reported Final     Acceptable 12/17/2022 Yes   Final   Office Visit on 12/11/2022   Component Date Value Ref Range Status    SARS Coronavirus 2 Antigen 12/11/2022 Negative  Negative Final     Acceptable 12/11/2022 Yes   Final        MENTAL STATUS EXAM  General:  Alert and oriented x 4 Appearance is good grooming and hygiene, appears stated age, Body mass index is 25.61 kg/m². . Behavior: friendly and cooperative, eye contact normal.  Gait, Posture and Muscle Strength/Tone: Normal gait and posture observed while watching patient walk to exam room. No gross abnormal movements noted.  Psychomotor Agitation and Retardation: none evident  Speech: Normal rate, volume, articulation, and tone.  Mood: +depressed +anxious  Affect: dysthymic  Thought Process: Linear and coherent. No flight of ideas.  Associations:  Intact. No loosening of associations.  Thought content: Denies suicidal and homicidal thoughts, plans and intentions.  Perceptions: Denies any auditory or visual hallucinations. Does not appear internally preoccupied.  Orientation: Alert and oriented to person, place, date and situation  Attention and Concentration: Intact.   Memory: Intact grossly   Language: No deficits noted   Fund of Knowledge: appropriate for age and level and education.   Insight:   good  Judgment: good  Impulse control: good      ASSESSMENT  Problem List Items Addressed This Visit          Psychiatric    PTSD (post-traumatic stress disorder)    Relevant Medications    prazosin (MINIPRESS) 1 MG Cap    TEDDY (generalized anxiety disorder)    Rule out borderline personality disorder     Other Visit Diagnoses       Bipolar 2 disorder    -   Primary            Will try another medication for sleep. She is interested in therapy. Patient reminded of symptoms of hypomania and sonia. She would like something to help with anxiety before OB/GYN visits. Future ADHD eval?     PLAN  Patient Instructions   Continue Wellbutrin XL 300mg daily  Tomorrow, stop lamictal 25mg daily.  In two days, switch seroquel 50mg to prazosin 1mg nightly.  [facility], sleep podcast    Follow up in 2 weeks.    Psychotherapy:     [facility] ([facility])   Call to make an appointment within [facility] for psychiatry/psychology [phone].     [facility]   [email or web address]   [email or web address]   Options for Behavioral Health.     [facility]   [facility]: [phone]       [facility]   Cognitive Behavioral Therapy (CBT) Center [facility]   Address: [facility] [place] [address]   Phone: [phone]   [email or web address]       [facility]   Address:  [address] [place] [address]   Phone: [phone]   You can email for an appointment at: [email or web address]       [facility]   Address: [address] [place] [address], [address], [facility]   [email or web address]   Dr. Erica Biggs, [phone] or [email or web address]   Dr. Maria Lindsay, [phone] or [email or web address]     DBT   [facility] [place] [email or web address]/   [facility] [email or web address]     [facility]   [email or web address]     [facility]   [email or web address]     [facility]   [email or web address]     [facility]   [email or web address]/us - Search for a therapist in your area based on your insurance coverage and provider expertise.       Employee Assistance Program (EAP)   Check through your employer's HR.        Online Therapist:     https://www.[email or web address]-Viewline.[employer]/health/mental-health/online-psychiatrist#A-quick-look-at-the-top-ttjukp-tlpuugyupx-rzwpsblz-in-2021     Free Guided Meditations   Https://[email or web address]/audio   Https://[email or web address] [email or web address]   Https://health.[facility]/specialties/mindfulness/programs/mbsr/pages/audio.aspx       Crisis Support:     If you are having an emergency, please call 911 or visit your nearest Emergency Department.     Local Hotlines:   [place] Crisis Line   (152) 687-BILJ   Free, confidential crisis counseling, suicide prevention, and information and referral services 24/7.     [facility] Crisis Response Team   [phone]   Team is available 24/7 in [facility].     [facility] Human [facility]   [phone] or [phone]   Crisis line for [facility].     Crisis Transportation Service ([facility])   Call 911 and ask for a crisis unit.  There are two teams available (between 12 pm - 12 am) to provide crisis intervention and transportation to local hospitals.  This service is offered jointly by [facility] and the Office of Health and Hospitals.     [facility]   [email or web address]   Options for Urgent Care.     National Hotlines:   National Suicide Prevention Lifeline   (666) 873-1014   Free and confidential 24/7 support, information, and resources.     ANDREA   (711) 179-LAGO   This line can be reached Monday through Friday, 9 am to 5 pm CST.     Crisis Call Center   [phone]   Call the Crisis Call Center or text CARE to 777220 for 24/7/365 crisis support.     Suicide.org   (557) 251-5718   Call the hotline to receive 24/7 crisis support and to learn about resources in your area.     Crisis Text Line (volunteer help)   Text HOME to 144264   Crisis counselors are trained volunteers and not professionals, and can provide support but not medical advice.         -------------------------------------------------------------------------------    Arlene Hughes  [facility] - PSYCHIATRY    Today's encounter took a total time of 30 minutes, and that time included patient interview and documentation.    Risks, Benefits, Side Effects and Alternatives were discussed with the patient today and consent was obtained for the current medication regimen. The patient was encouraged to ask questions and these questions were answered and the patient was engaged in psychoeducation regarding diagnosis, course of illness, accuracy of the diagnosis, and other general elements regarding overall diagnosis and treatment consideration.     Any medications being used off-label were discussed with the patient inclusive of the evidence base for the use of the medications and consent was obtained for the off-label use of the medication.     Brief suicide risk assessment was performed with the patient today and safety planning was performed if indicated.    Note completed by: Arlene Hughes MD, 1/23/2023 8:32 AM

## 2023-01-18 NOTE — PATIENT INSTRUCTIONS
Continue Wellbutrin XL 300mg daily  Tomorrow, stop lamictal 25mg daily.  In two days, switch seroquel 50mg to prazosin 1mg nightly.  [employer], sleep podcast    Follow up in 2 weeks.    Psychotherapy:     [facility] ([facility])   Call to make an appointment within [facility] for psychiatry/psychology [phone].     [facility]   [email or web address]://[email or web address]/[email or web address]-anywhere-care   https://www.ochsneranywherecare.com/landing.htm   Options for Behavioral Health.     [facility]   [facility]: [phone]       CBT [facility]   Cognitive Behavioral Therapy (CBT) Center [facility]   Address: [facility] [place] [address]   Phone: [phone]   www.Manflu       Integrated Behavioral Health [facility]   Address:  [address] [place] [address]   Phone: [phone]   You can email for an appointment at: [email or web address]       [facility]   Address: [address] [place] [address], [address], [address]   Https://[facility]/   Dr. Erica Biggs, [phone] or [email or web address]   Dr. Maria Lindsay, [phone] or [email or web address]     DBT   DBT [place] https://dbtneInfinite Power Solutions.Skinfix/   DBT [facility] https://dbtDibspace/     [facility]   http://www.jeffLancaster General Hospitalneuro.com/     [facility]   https://www.[facility] Hospitalpsychotherapy.com/index.html     [facility]   https://www.atlaspsychiatry.com/     PsychologyToday   https://www.psychologytoday.com/us - Search for a therapist in your area based on your insurance coverage and provider expertise.       Employee Assistance Program (EAP)   Check through your employer's HR.       Online Therapist:     https://www.BioAtlantis.Skinfix/health/mental-health/online-psychiatrist#A-quick-look-at-the-top-bdxgad-wqvnfdgwhe-tdyckieo-in-2021     Free Guided Meditations   Https://MyHealthTeams/audio    Https://www.[facility].org/ivette/body.cfm?id=22&iirf_redirect=1   Https://health.[facility]/specialties/mindfulness/programs/mbsr/pages/audio.aspx       Crisis Support:     If you are having an emergency, please call 911 or visit your nearest Emergency Department.     Local Hotlines:   [place] Crisis Line   (365) 513-QGEM   Free, confidential crisis counseling, suicide prevention, and information and referral services 24/7.     [facility] Crisis Response Team   [phone]   Team is available 24/7 in [facility].     [facility] Human Services Authority   [phone] or [phone]   Crisis line for [facility].     Crisis Transportation Service ([facility])   Call 911 and ask for a crisis unit.  There are two teams available (between 12 pm - 12 am) to provide crisis intervention and transportation to local hospitals.  This service is offered jointly by [facility] and the Office of Health and Hospitals.     [facility]   https://www.ochs[facility].org/[facility]sner-anywhere-care   Options for Urgent Care.     National Hotlines:   National Suicide Prevention Lifeline   (420) 919-1355   Free and confidential 24/7 support, information, and resources.     ANDREA   (064) 892-DYSP   This line can be reached Monday through Friday, 9 am to 5 pm CST.     Crisis Call Center   ([phone]   Call the Crisis Call Center or text CARE to 896357 for 24/7/365 crisis support.     Suicide.org   (825) 636-8895   Call the hotline to receive 24/7 crisis support and to learn about resources in your area.     Crisis Text Line (volunteer help)   Text HOME to 828146   Crisis counselors are trained volunteers and not professionals, and can provide support but not medical advice.

## 2023-01-31 ENCOUNTER — PATIENT MESSAGE (OUTPATIENT)
Dept: PSYCHIATRY | Facility: CLINIC | Age: 23
End: 2023-01-31
Payer: COMMERCIAL

## 2023-02-08 ENCOUNTER — TELEPHONE (OUTPATIENT)
Dept: OBSTETRICS AND GYNECOLOGY | Facility: CLINIC | Age: 23
End: 2023-02-08
Payer: COMMERCIAL

## 2023-02-22 ENCOUNTER — OFFICE VISIT (OUTPATIENT)
Dept: PSYCHIATRY | Facility: CLINIC | Age: 23
End: 2023-02-22
Payer: COMMERCIAL

## 2023-02-22 VITALS — WEIGHT: 160 LBS | BODY MASS INDEX: 25.11 KG/M2 | HEIGHT: 67 IN

## 2023-02-22 DIAGNOSIS — F43.10 PTSD (POST-TRAUMATIC STRESS DISORDER): Primary | ICD-10-CM

## 2023-02-22 DIAGNOSIS — F41.1 GAD (GENERALIZED ANXIETY DISORDER): ICD-10-CM

## 2023-02-22 DIAGNOSIS — Z13.39 ADHD (ATTENTION DEFICIT HYPERACTIVITY DISORDER) EVALUATION: ICD-10-CM

## 2023-02-22 DIAGNOSIS — F60.3 BORDERLINE PERSONALITY DISORDER: ICD-10-CM

## 2023-02-22 PROCEDURE — 1159F MED LIST DOCD IN RCRD: CPT | Mod: CPTII,95,, | Performed by: PSYCHIATRY & NEUROLOGY

## 2023-02-22 PROCEDURE — 1160F PR REVIEW ALL MEDS BY PRESCRIBER/CLIN PHARMACIST DOCUMENTED: ICD-10-PCS | Mod: CPTII,95,, | Performed by: PSYCHIATRY & NEUROLOGY

## 2023-02-22 PROCEDURE — 3008F PR BODY MASS INDEX (BMI) DOCUMENTED: ICD-10-PCS | Mod: CPTII,95,, | Performed by: PSYCHIATRY & NEUROLOGY

## 2023-02-22 PROCEDURE — 1160F RVW MEDS BY RX/DR IN RCRD: CPT | Mod: CPTII,95,, | Performed by: PSYCHIATRY & NEUROLOGY

## 2023-02-22 PROCEDURE — 99214 OFFICE O/P EST MOD 30 MIN: CPT | Mod: 95,,, | Performed by: PSYCHIATRY & NEUROLOGY

## 2023-02-22 PROCEDURE — 3008F BODY MASS INDEX DOCD: CPT | Mod: CPTII,95,, | Performed by: PSYCHIATRY & NEUROLOGY

## 2023-02-22 PROCEDURE — 99214 PR OFFICE/OUTPT VISIT, EST, LEVL IV, 30-39 MIN: ICD-10-PCS | Mod: 95,,, | Performed by: PSYCHIATRY & NEUROLOGY

## 2023-02-22 PROCEDURE — 1159F PR MEDICATION LIST DOCUMENTED IN MEDICAL RECORD: ICD-10-PCS | Mod: CPTII,95,, | Performed by: PSYCHIATRY & NEUROLOGY

## 2023-02-22 RX ORDER — BUPROPION HYDROCHLORIDE 300 MG/1
300 TABLET ORAL DAILY
Qty: 90 TABLET | Refills: 1 | Status: SHIPPED | OUTPATIENT
Start: 2023-02-22

## 2023-02-22 RX ORDER — ARIPIPRAZOLE 2 MG/1
2 TABLET ORAL DAILY
Qty: 30 TABLET | Refills: 2 | Status: SHIPPED | OUTPATIENT
Start: 2023-02-22

## 2023-02-22 RX ORDER — ESCITALOPRAM OXALATE 10 MG/1
10 TABLET ORAL DAILY
Qty: 90 TABLET | Refills: 1 | Status: SHIPPED | OUTPATIENT
Start: 2023-02-22

## 2023-02-22 NOTE — PATIENT INSTRUCTIONS
Continue lexapro 10mg daily.  Continue Wellbutrin XL 300mg daily.  Start abilify 2mg in the evening with food.  Labs this week. Fast beforehand.    Follow up in April or May.    Psychotherapy:     [facility] ([facility])   Call to make an appointment within [facility] for [facility]ry/psychology [phone].     [facility]   https://www.ochsner.[email or web address]/[facility]sner-anywhere-care   https://www.ochsneranywherecare.com/landing.htm   Options for Behavioral Health.     hospitals Behavioral Health   hospitals [facility] Center: [phone]       CBT [facility]   Cognitive Behavioral Therapy (CBT) Center [facility]   Address: [address] [place] [address]   Phone: [phone]   www.[email or web address]       Integrated Behavioral Health [facility]   Address:  [address] [place] [address]   Phone: [phone]   You can email for an appointment at: [email or web address]       [facility]   Address: [address] [place] [address], [place] [address]   Https://Piano Media/   Dr. Erica Biggs, [phone] or [email or web address]   Dr. Maria Lindsay, [phone] or [email or web address]     DBT   DBT [place] https://dbtneToodalu.GeneriMed/   DBT [facility] https://dbtCG Scholar.GeneriMed/     [facility]   http://www.jeffSharon Regional Medical Centerneuro.com/     [facility]   https://www.[facility] Center[facility].com/index.html     [facility]   https://www.atlaspsychiatry.com/     PsychologyToday   https://www.psychologytoday.com/us - Search for a therapist in your area based on your insurance coverage and provider expertise.       Employee Assistance Program (EAP)   Check through your employer's HR.       Online Therapist:     https://www.iSTAR.GeneriMed/health/mental-health/online-psychiatrist#A-quick-look-at-the-top-juacrp-vlpqieecmz-ldcurrzl-in-2021     Free Guided Meditations   Https://stressremedy.com/audio    Https://www.[facility].org/ivette/body.cfm?id=22&iirf_redirect=1   Https://health.RUST.[facility]/specialties/mindfulness/programs/mbsr/pages/audio.aspx       Crisis Support:     If you are having an emergency, please call 911 or visit your nearest Emergency Department.     Local Hotlines:   [place] Crisis Line   (206) 264-ALBY   Free, confidential crisis counseling, suicide prevention, and information and referral services 24/7.     [facility] Crisis Response Team   [phone]   Team is available 24/7 in [facility].     [facility] Human Services Authority   [phone] or [phone]   Crisis line for [facility].     Crisis Transportation Service ([facility])   Call 911 and ask for a crisis unit.  There are two teams available (between 12 pm - 12 am) to provide crisis intervention and transportation to local hospitals.  This service is offered jointly by [facility] and the Office of Health and Hospitals.     [facility]   https://www.[email or web address].org/ochsner-anywhere-care   Options for Urgent Care.     National Hotlines:   National Suicide Prevention Lifeline   (870) 420-8204   Free and confidential 24/7 support, information, and resources.     ANDREA   (626) 375-KHWZ   This line can be reached Monday through Friday, 9 am to 5 pm CST.     Crisis Call Center   ([phone]   Call the Crisis Call Center or text CARE to 268415 for 24/7/365 crisis support.     Suicide.org   (548) 308-7817   Call the hotline to receive 24/7 crisis support and to learn about resources in your area.     Crisis Text Line (volunteer help)   Text HOME to 726173   Crisis counselors are trained volunteers and not professionals, and can provide support but not medical advice.

## 2023-02-22 NOTE — PROGRESS NOTES
Subsequent Psychiatric Session-VIRTUAL    22 y.o. female    Reason for Follow Up:   1. PTSD (post-traumatic stress disorder)    2. TEDDY (generalized anxiety disorder)    3. Borderline personality disorder    4. ADHD (attention deficit hyperactivity disorder) evaluation        Current Medications:    Current Outpatient Medications:     levonorgestreL (KYLEENA) 17.5 mcg/24 hrs (5 yrs) 19.5 mg IUD, 1 each by Intrauterine route., Disp: , Rfl:     ARIPiprazole (ABILIFY) 2 MG Tab, Take 1 tablet (2 mg total) by mouth once daily., Disp: 30 tablet, Rfl: 2    buPROPion (WELLBUTRIN XL) 300 MG 24 hr tablet, Take 1 tablet (300 mg total) by mouth once daily., Disp: 90 tablet, Rfl: 1    EScitalopram oxalate (LEXAPRO) 10 MG tablet, Take 1 tablet (10 mg total) by mouth once daily., Disp: 90 tablet, Rfl: 1     Date of Last Visit:   01/18/23    In Clinic Since:   August 2022  Therapy:    [facility]    Interval History  She reported less appetite. She has been having anxiety attacks every couple of days, during sex and thinking about finances and jobs. She had a panic attack while drunk last week. She has passive, brief SI during anxiety attacks without intent or plan. She denies medication side effects. She denies HI, AVH.    She is stressed about her finances. She is between jobs and [employer] is not enough to pay the bills. She had to leave [facility] because they weren't paying her and when they did it was at a lower rate than what was promised. 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 She felt faint on prazosin, so she stopped it after 3-4 days. It helped her sleep those few days. She has not restarted seroquel. She has been sleeping well without any medication. Her period was last week. She was depressed the week before her period again. She stopped therapy through [facility] for financial reasons. She does not want to go back on lamictal, because of fears of  "rash and weight gain. She also had headaches on it. She skipped her ob/gyn visit, because she was too anxious. She missed her last appointment where this writer was to prescribe a short trial of klonopin.    Medication Trials:                             She has tried Zoloft, Prozac, Celexa, and Lexapro. She feels lexapro has been the best SSRI, but she has been weaning off of it because of lethargy. On SSRIs, "sometimes I feel a lot worse, sometimes I feel alright." Lamictal caused headaches. Prazosin caused lightheadedness. She has not tried lithium, latuda, abilify, trazodone, remeron.    PSYCHOTHERAPY ADD-ON +45756 30 minutes (range 16-37 minutes)  Therapeutic intervention type: supportive psychotherapy  Why chosen therapy is appropriate versus another modality: relevant to diagnosis  Target symptoms addressed: stress  Topics and themes discussed: See above  Primary focus: see above  Psychotherapeutic techniques employed: active listening and empathic responses  Outcome monitoring methods: self-report, observation  The patient's response to the intervention is: accepting  The patient's progress toward treatment goals is: good  Duration of intervention: 16 minutes      Manic/Hypomanic Symptom Screening:  Since the last session, have you had any periods lasting at least a few days where your energy level was higher than normal and you did not need as much sleep at night to function the following day?: No    Substance Use Screening:  She denied drug use recently. She drank too much this weekend, but otherwise had about 2 drinks daily. She plans to cut back now that it is not Luis [place] Gras.    Review of Measures  PHQ-9: 14  TEDDY-7: 12    Scores from last session:   PHQ-9: 19  TEDDY-7: 8    PHQ-9: 13  TEDDY-7: 10    Scores from initial session:  PHQ9 Score:              16/27  GAD7 Score:              10/21       Review of PDMP  Reviewed this session     Constitutional     VITAL SIGNS  Temp     BP      HR      RR      SpO2   "         Office Visit on 12/17/2022   Component Date Value Ref Range Status    POC Molecular Influenza A Ag 12/17/2022 Negative  Negative, Not Reported Final    POC Molecular Influenza B Ag 12/17/2022 Negative  Negative, Not Reported Final     Acceptable 12/17/2022 Yes   Final   Office Visit on 12/11/2022   Component Date Value Ref Range Status    SARS Coronavirus 2 Antigen 12/11/2022 Negative  Negative Final     Acceptable 12/11/2022 Yes   Final        MENTAL STATUS EXAM  General:  Alert and oriented x 4 Appearance is good grooming and hygiene, appears stated age, Body mass index is 25.06 kg/m². Behavior: friendly and cooperative, eye contact normal.  Gait, Posture and Muscle Strength/Tone: Normal posture observed. No gross abnormal movements noted.  Psychomotor Agitation and Retardation: none evident  Speech: Normal rate, volume, articulation, and tone.  Mood: +depressed +anxious  Affect: dysthymic  Thought Process: Linear and coherent. No flight of ideas.  Associations:  Intact. No loosening of associations.  Thought content: Denies suicidal and homicidal thoughts, plans and intentions.  Perceptions: Denies any auditory or visual hallucinations. Does not appear internally preoccupied.  Orientation: Alert and oriented to person, place, date and situation  Attention and Concentration: Intact.   Memory: Intact grossly   Language: No deficits noted   Fund of Knowledge: appropriate for age and level and education.   Insight:   good  Judgment: good  Impulse control: good      ASSESSMENT  Problem List Items Addressed This Visit          Psychiatric    PTSD (post-traumatic stress disorder) - Primary    Relevant Medications    EScitalopram oxalate (LEXAPRO) 10 MG tablet    buPROPion (WELLBUTRIN XL) 300 MG 24 hr tablet    TEDDY (generalized anxiety disorder)    Relevant Medications    ARIPiprazole (ABILIFY) 2 MG Tab    EScitalopram oxalate (LEXAPRO) 10 MG tablet    buPROPion (WELLBUTRIN XL) 300  MG 24 hr tablet    Rule out borderline personality disorder     Other Visit Diagnoses       ADHD (attention deficit hyperactivity disorder) evaluation        Relevant Orders    Ambulatory referral/consult to Psychology          Will try another medication for sleep. She is interested in therapy. Patient reminded of symptoms of hypomania and sonia. She would like something to help with anxiety before OB/GYN visits. Future ADHD eval?     PLAN  Patient Instructions   Continue lexapro 10mg daily.  Continue Wellbutrin XL 300mg daily.  Start abilify 2mg in the evening with food.  Labs this week. Fast beforehand.    Follow up in April or May.    Psychotherapy:     [facility] ([facility])   Call to make an appointment [facility] for [facility]/psychology [phone].     [facility]   https://www.[email or web address]/[email or web address]-Critical access hospitalwhere-care   https://www.ochsneranywherecare.Huckletree/landing.htm   Options for Behavioral Health.     [facility]   [facility]: [phone]       [facility]   Cognitive Behavioral Therapy (CBT) Center [facility]   Address: [facility] [place] [address]   Phone: [phone]   www.[email or web address]       Integrated Behavioral Health [facility]   Address:  [address] [place] [address]   Phone: [phone]   You can email for an appointment at: [email or web address]       [facility] Professional [facility]   Address: [address] [place] [address], [address], [address]   Https://"[employer]/   Dr. Erica Biggs, [phone] or [email or web address]@"[email or web address]   Dr. Maria Lindsay, [phone] or [email or web address]@"[email or web address]     DBT   DBT [place] https://dbtneMevion Medical Systems.Huckletree/   DBT [facility] https://dbtnola.Huckletree/     [facility]   http://www.jeffersonneuro.com/     [facility]   https://www.Robotronicapsychotherapy.Huckletree/index.html     [facility]   https://www.atlaspsychiatry.com/     PsychologyToday    https://www.Mozilla.com/us - Search for a therapist in your area based on your insurance coverage and provider expertise.       Employee Assistance Program (EAP)   Check through your employer's HR.       Online Therapist:     https://www.Visionary Fun/health/mental-health/online-psychiatrist#A-quick-look-at-the-top-cgfynw-rwgutcsmcz-oggcqzfh-in-2021     Free Guided Meditations   Https://Hypercontext/audio   Https://www.Veebeam.Lifeproof/ivette/body.cfm?id=22&iirf_redirect=1   Https://health.[facility]/specialties/mindfulness/programs/mbsr/pages/audio.aspx       Crisis Support:     If you are having an emergency, please call 911 or visit your nearest Emergency Department.     Local Hotlines:   [place] Crisis Line   (683) 027-BCQQ   Free, confidential crisis counseling, suicide prevention, and information and referral services 24/7.     [facility] Crisis Response Team   [phone]   Team is available 24/7 in [facility].     [facility] Human Services Authority   [phone] or [phone]   Crisis line for [facility].     Crisis Transportation Service ([facility]OP[facility]-CTS)   Call 911 and ask for a crisis unit.  There are two teams available (between 12 pm - 12 am) to provide crisis intervention and transportation to local hospitals.  This service is offered jointly by [facility] and the Office of Health and Hospitals.     [facility]   https://www.ochs[facility].org/[facility]sner-anywhere-care   Options for Urgent Care.     National Hotlines:   National Suicide Prevention Lifeline   (757) 877-6069   Free and confidential 24/7 support, information, and resources.     ANDREA   (923) 141-ANDREA   This line can be reached Monday through Friday, 9 am to 5 pm CST.     Crisis Call Center   [phone]) [phone]   Call the Crisis Call Center or [facility] CARE to 978423 for 24/7/365 crisis support.     Suicide.org   (817) 246-5996   Call the hotline to receive 24/7 crisis support and to learn about  resources in your area.     Crisis Text Line (volunteer help)   Text HOME to 396757   Crisis counselors are trained volunteers and not professionals, and can provide support but not medical advice.        -------------------------------------------------------------------------------    Arlene Hughes  [facility] - PSYCHIATRY    Today's encounter took a total time of 30 minutes, and that time included patient interview and documentation.    Risks, Benefits, Side Effects and Alternatives were discussed with the patient today and consent was obtained for the current medication regimen. The patient was encouraged to ask questions and these questions were answered and the patient was engaged in psychoeducation regarding diagnosis, course of illness, accuracy of the diagnosis, and other general elements regarding overall diagnosis and treatment consideration.     Any medications being used off-label were discussed with the patient inclusive of the evidence base for the use of the medications and consent was obtained for the off-label use of the medication.     Brief suicide risk assessment was performed with the patient today and safety planning was performed if indicated.    Note completed by: Arlene Hughes MD, 2/28/2023 8:32 AM

## 2023-03-07 ENCOUNTER — TELEPHONE (OUTPATIENT)
Dept: OBSTETRICS AND GYNECOLOGY | Facility: CLINIC | Age: 23
End: 2023-03-07
Payer: COMMERCIAL

## 2023-03-07 NOTE — TELEPHONE ENCOUNTER
Spoke with pt  Pt scheduled for IUD tomorrow as she would like to have it removed.    Pt verbalized  understanding.

## 2023-03-07 NOTE — TELEPHONE ENCOUNTER
----- Message from Jaymie Styles sent at 3/7/2023 11:21 AM CST -----  Regarding: CALL BACK  .Type: Patient Call Back    Who called: ALLEN MARRERO [[id number]]    What is the request in detail: PT stated that her IUD is loose and she would like to have it removed. Her appt is not until 3/13/2023. I did verbalize to the patient that I am unable to do an squeeze and so she would like to speak with the office directly.Please contact to further discuss and advise.     Can the clinic reply by [facility]? NO    Would the patient rather a call back or a response via [facility]? CALL BACK    Best call back number: [phone]    Additional Information: N/A

## 2023-03-08 ENCOUNTER — PROCEDURE VISIT (OUTPATIENT)
Dept: OBSTETRICS AND GYNECOLOGY | Facility: CLINIC | Age: 23
End: 2023-03-08
Attending: OBSTETRICS & GYNECOLOGY
Payer: COMMERCIAL

## 2023-03-08 VITALS
BODY MASS INDEX: 25.33 KG/M2 | WEIGHT: 161.38 LBS | DIASTOLIC BLOOD PRESSURE: 68 MMHG | SYSTOLIC BLOOD PRESSURE: 112 MMHG | HEIGHT: 67 IN

## 2023-03-08 DIAGNOSIS — Z30.432 ENCOUNTER FOR IUD REMOVAL: Primary | ICD-10-CM

## 2023-03-08 LAB
B-HCG UR QL: NEGATIVE
CTP QC/QA: YES

## 2023-03-08 PROCEDURE — 81025 POCT URINE PREGNANCY: ICD-10-PCS | Mod: S$GLB,,, | Performed by: OBSTETRICS & GYNECOLOGY

## 2023-03-08 PROCEDURE — 81025 URINE PREGNANCY TEST: CPT | Mod: S$GLB,,, | Performed by: OBSTETRICS & GYNECOLOGY

## 2023-03-08 PROCEDURE — 99499 UNLISTED E&M SERVICE: CPT | Mod: S$GLB,,, | Performed by: OBSTETRICS & GYNECOLOGY

## 2023-03-08 PROCEDURE — 99499 NO LOS: ICD-10-PCS | Mod: S$GLB,,, | Performed by: OBSTETRICS & GYNECOLOGY

## 2023-03-08 NOTE — PROCEDURES
Procedures    Patient here for IUD removal.  States she will discuss contraceptive options with her hematologist.    Patient unable to tolerate speculum placement.  She will follow-up with her psychiatrist regarding what medications she can take prior to pelvic exam & IUD removal

## 2023-03-10 ENCOUNTER — PATIENT MESSAGE (OUTPATIENT)
Dept: PSYCHIATRY | Facility: CLINIC | Age: 23
End: 2023-03-10
Payer: COMMERCIAL

## 2023-03-10 ENCOUNTER — TELEPHONE (OUTPATIENT)
Dept: OBSTETRICS AND GYNECOLOGY | Facility: CLINIC | Age: 23
End: 2023-03-10
Payer: COMMERCIAL

## 2023-03-10 NOTE — TELEPHONE ENCOUNTER
----- Message from Remy Daily sent at 3/10/2023  3:37 PM CST -----  Please call pt regarding her appt on mon [phone]

## 2023-03-10 NOTE — TELEPHONE ENCOUNTER
Spoke with pt  Pt has not been able to get appt with Psychiatrist until late next week and is not able to get prescription for anxiety medication to have IUD pulled.  Pt request message be sent to Dr Orourke for possible refill    Pt will reschedule if IUD removal appt on Monday if not able to get medication.

## 2023-03-13 ENCOUNTER — TELEPHONE (OUTPATIENT)
Dept: OBSTETRICS AND GYNECOLOGY | Facility: CLINIC | Age: 23
End: 2023-03-13
Payer: COMMERCIAL

## 2023-03-13 NOTE — TELEPHONE ENCOUNTER
Spoke with pt  Pt advinwsed that Dr Orourke will not be able to prescribe any medicaion for anxienty for her IUD removal and pt will need to get prescription from her Psychiatrist for IUD removal    Pt verbalized understanding.

## 2023-03-16 ENCOUNTER — TELEPHONE (OUTPATIENT)
Dept: OBSTETRICS AND GYNECOLOGY | Facility: CLINIC | Age: 23
End: 2023-03-16
Payer: COMMERCIAL

## 2023-03-16 NOTE — TELEPHONE ENCOUNTER
Staff spoke with pt  Pt calling and needs IUD out today. Pt states she is going to do the removal without anxiety medication as she will not be able to see her psychiatrist until next week. Pt states she is in so much pain she can not work and needs IUD out today.    Pt scheduled for appt today with Dr Orourke

## 2023-03-16 NOTE — TELEPHONE ENCOUNTER
----- Message from Alejandro Flor sent at 3/16/2023  2:12 PM CDT -----   Name of Who is Calling:     What is the request in detail:  patient call in reference to cancel procedure   /  patient will call back when ready to reschedule     Can the clinic reply by [facility]:     What Number to Call Back [facility]:  [phone]

## 2023-03-16 NOTE — TELEPHONE ENCOUNTER
----- Message from Flavia Bond sent at 3/16/2023  8:35 AM CDT -----  Regarding: Appt  Contact: [phone]  Patient Laney is calling. Patient would like to reschedule her missed appt for her IUD removal. Please call patient at [phone]    Thank You

## 2023-07-20 ENCOUNTER — LAB (OUTPATIENT)
Dept: LAB | Facility: LAB | Age: 23
End: 2023-07-20
Payer: COMMERCIAL

## 2023-07-20 ENCOUNTER — OFFICE VISIT (OUTPATIENT)
Dept: PRIMARY CARE | Facility: CLINIC | Age: 23
End: 2023-07-20
Payer: COMMERCIAL

## 2023-07-20 VITALS
HEIGHT: 67 IN | RESPIRATION RATE: 16 BRPM | BODY MASS INDEX: 27.15 KG/M2 | DIASTOLIC BLOOD PRESSURE: 70 MMHG | HEART RATE: 73 BPM | SYSTOLIC BLOOD PRESSURE: 116 MMHG | WEIGHT: 173 LBS | TEMPERATURE: 97.7 F

## 2023-07-20 DIAGNOSIS — F31.81 BIPOLAR 2 DISORDER (MULTI): ICD-10-CM

## 2023-07-20 DIAGNOSIS — R42 EPISODIC LIGHTHEADEDNESS: ICD-10-CM

## 2023-07-20 DIAGNOSIS — F41.8 DEPRESSION WITH ANXIETY: ICD-10-CM

## 2023-07-20 DIAGNOSIS — Z01.419 ENCOUNTER FOR GYNECOLOGICAL EXAMINATION WITHOUT ABNORMAL FINDING: ICD-10-CM

## 2023-07-20 DIAGNOSIS — R42 DIZZINESS: Primary | ICD-10-CM

## 2023-07-20 DIAGNOSIS — R42 DIZZINESS: ICD-10-CM

## 2023-07-20 PROBLEM — I82.629: Status: ACTIVE | Noted: 2023-07-20

## 2023-07-20 PROBLEM — N92.6 IRREGULAR MENSES: Status: ACTIVE | Noted: 2023-07-20

## 2023-07-20 PROBLEM — M62.81 MUSCLE WEAKNESS: Status: RESOLVED | Noted: 2023-07-20 | Resolved: 2023-07-20

## 2023-07-20 PROBLEM — S29.019A STRAIN OF THORACIC REGION: Status: RESOLVED | Noted: 2023-07-20 | Resolved: 2023-07-20

## 2023-07-20 PROBLEM — R52 BODY ACHES: Status: RESOLVED | Noted: 2023-07-20 | Resolved: 2023-07-20

## 2023-07-20 PROBLEM — F32.A DEPRESSION: Status: ACTIVE | Noted: 2023-07-20

## 2023-07-20 PROBLEM — J01.90 ACUTE SINUSITIS: Status: ACTIVE | Noted: 2023-07-20

## 2023-07-20 PROBLEM — M47.819 SPINAL ARTHRITIS: Status: ACTIVE | Noted: 2023-07-20

## 2023-07-20 PROBLEM — F60.3 BORDERLINE PERSONALITY DISORDER (MULTI): Status: ACTIVE | Noted: 2022-09-05

## 2023-07-20 PROBLEM — M54.50 LOW BACK PAIN: Status: ACTIVE | Noted: 2023-07-20

## 2023-07-20 PROBLEM — J01.90 ACUTE SINUSITIS: Status: RESOLVED | Noted: 2023-07-20 | Resolved: 2023-07-20

## 2023-07-20 PROBLEM — M54.50 LOW BACK PAIN: Status: RESOLVED | Noted: 2023-07-20 | Resolved: 2023-07-20

## 2023-07-20 PROBLEM — R63.4 WEIGHT LOSS, UNINTENTIONAL: Status: ACTIVE | Noted: 2023-07-20

## 2023-07-20 PROBLEM — S29.019A STRAIN OF THORACIC REGION: Status: ACTIVE | Noted: 2023-07-20

## 2023-07-20 PROBLEM — D68.51 HOMOZYGOUS FACTOR V LEIDEN MUTATION (MULTI): Status: ACTIVE | Noted: 2023-07-20

## 2023-07-20 PROBLEM — N62 MACROMASTIA: Status: ACTIVE | Noted: 2023-07-20

## 2023-07-20 PROBLEM — M54.9 PAIN, UPPER BACK: Status: ACTIVE | Noted: 2023-07-20

## 2023-07-20 PROBLEM — G89.29 CHRONIC PAIN: Status: ACTIVE | Noted: 2023-07-20

## 2023-07-20 PROBLEM — U07.1 COVID-19 VIRUS INFECTION: Status: RESOLVED | Noted: 2023-07-20 | Resolved: 2023-07-20

## 2023-07-20 PROBLEM — B35.4 TINEA CORPORIS: Status: ACTIVE | Noted: 2023-07-20

## 2023-07-20 PROBLEM — L50.8 ACUTE URTICARIA: Status: ACTIVE | Noted: 2023-07-20

## 2023-07-20 PROBLEM — L50.8 ACUTE URTICARIA: Status: RESOLVED | Noted: 2023-07-20 | Resolved: 2023-07-20

## 2023-07-20 PROBLEM — R41.840 ATTENTION DISTURBANCE: Status: ACTIVE | Noted: 2023-07-20

## 2023-07-20 PROBLEM — M62.81 MUSCLE WEAKNESS: Status: ACTIVE | Noted: 2023-07-20

## 2023-07-20 PROBLEM — M62.830 LUMBAR PARASPINAL MUSCLE SPASM: Status: ACTIVE | Noted: 2023-07-20

## 2023-07-20 PROBLEM — E55.9 VITAMIN D DEFICIENCY: Status: ACTIVE | Noted: 2023-07-20

## 2023-07-20 PROBLEM — N91.5 OLIGOMENORRHEA: Status: ACTIVE | Noted: 2023-07-20

## 2023-07-20 PROBLEM — Z97.5 IUD CONTRACEPTION: Status: ACTIVE | Noted: 2023-07-20

## 2023-07-20 PROBLEM — K29.80 DUODENITIS: Status: ACTIVE | Noted: 2023-07-20

## 2023-07-20 PROBLEM — U07.1 COVID-19 VIRUS INFECTION: Status: ACTIVE | Noted: 2023-07-20

## 2023-07-20 PROBLEM — R52 BODY ACHES: Status: ACTIVE | Noted: 2023-07-20

## 2023-07-20 LAB
ALANINE AMINOTRANSFERASE (SGPT) (U/L) IN SER/PLAS: 9 U/L (ref 7–45)
ALBUMIN (G/DL) IN SER/PLAS: 4.5 G/DL (ref 3.4–5)
ALKALINE PHOSPHATASE (U/L) IN SER/PLAS: 70 U/L (ref 33–110)
ANION GAP IN SER/PLAS: 15 MMOL/L (ref 10–20)
ASPARTATE AMINOTRANSFERASE (SGOT) (U/L) IN SER/PLAS: 11 U/L (ref 9–39)
BILIRUBIN TOTAL (MG/DL) IN SER/PLAS: 0.5 MG/DL (ref 0–1.2)
CALCIDIOL (25 OH VITAMIN D3) (NG/ML) IN SER/PLAS: 15 NG/ML
CALCIUM (MG/DL) IN SER/PLAS: 9.5 MG/DL (ref 8.6–10.3)
CARBON DIOXIDE, TOTAL (MMOL/L) IN SER/PLAS: 23 MMOL/L (ref 21–32)
CHLORIDE (MMOL/L) IN SER/PLAS: 107 MMOL/L (ref 98–107)
COBALAMIN (VITAMIN B12) (PG/ML) IN SER/PLAS: 374 PG/ML (ref 211–911)
CREATININE (MG/DL) IN SER/PLAS: 0.74 MG/DL (ref 0.5–1.05)
ERYTHROCYTE DISTRIBUTION WIDTH (RATIO) BY AUTOMATED COUNT: 11.9 % (ref 11.5–14.5)
ERYTHROCYTE MEAN CORPUSCULAR HEMOGLOBIN CONCENTRATION (G/DL) BY AUTOMATED: 34 G/DL (ref 32–36)
ERYTHROCYTE MEAN CORPUSCULAR VOLUME (FL) BY AUTOMATED COUNT: 88 FL (ref 80–100)
ERYTHROCYTES (10*6/UL) IN BLOOD BY AUTOMATED COUNT: 4.63 X10E12/L (ref 4–5.2)
GFR FEMALE: >90 ML/MIN/1.73M2
GLUCOSE (MG/DL) IN SER/PLAS: 98 MG/DL (ref 74–99)
HEMATOCRIT (%) IN BLOOD BY AUTOMATED COUNT: 40.9 % (ref 36–46)
HEMOGLOBIN (G/DL) IN BLOOD: 13.9 G/DL (ref 12–16)
LEUKOCYTES (10*3/UL) IN BLOOD BY AUTOMATED COUNT: 6.8 X10E9/L (ref 4.4–11.3)
PLATELETS (10*3/UL) IN BLOOD AUTOMATED COUNT: 252 X10E9/L (ref 150–450)
POTASSIUM (MMOL/L) IN SER/PLAS: 4.6 MMOL/L (ref 3.5–5.3)
PROTEIN TOTAL: 6.7 G/DL (ref 6.4–8.2)
SODIUM (MMOL/L) IN SER/PLAS: 140 MMOL/L (ref 136–145)
UREA NITROGEN (MG/DL) IN SER/PLAS: 13 MG/DL (ref 6–23)

## 2023-07-20 PROCEDURE — 1036F TOBACCO NON-USER: CPT | Performed by: FAMILY MEDICINE

## 2023-07-20 PROCEDURE — 80053 COMPREHEN METABOLIC PANEL: CPT

## 2023-07-20 PROCEDURE — 82306 VITAMIN D 25 HYDROXY: CPT

## 2023-07-20 PROCEDURE — 36415 COLL VENOUS BLD VENIPUNCTURE: CPT

## 2023-07-20 PROCEDURE — 99214 OFFICE O/P EST MOD 30 MIN: CPT | Performed by: FAMILY MEDICINE

## 2023-07-20 PROCEDURE — 82607 VITAMIN B-12: CPT

## 2023-07-20 PROCEDURE — 84443 ASSAY THYROID STIM HORMONE: CPT

## 2023-07-20 PROCEDURE — 85027 COMPLETE CBC AUTOMATED: CPT

## 2023-07-20 PROCEDURE — 82728 ASSAY OF FERRITIN: CPT

## 2023-07-20 RX ORDER — ALBUTEROL SULFATE 90 UG/1
AEROSOL, METERED RESPIRATORY (INHALATION)
COMMUNITY
Start: 2022-01-06 | End: 2023-07-20 | Stop reason: ALTCHOICE

## 2023-07-20 RX ORDER — DIAZEPAM 5 MG/1
TABLET ORAL
COMMUNITY
Start: 2023-04-06 | End: 2023-07-20 | Stop reason: ALTCHOICE

## 2023-07-20 RX ORDER — QUETIAPINE FUMARATE 50 MG/1
50 TABLET, FILM COATED ORAL NIGHTLY
Qty: 30 TABLET | Refills: 6 | Status: SHIPPED | OUTPATIENT
Start: 2023-07-20

## 2023-07-20 RX ORDER — QUETIAPINE FUMARATE 50 MG/1
1 TABLET, FILM COATED ORAL NIGHTLY
COMMUNITY
End: 2023-07-20 | Stop reason: SDUPTHER

## 2023-07-20 RX ORDER — ESCITALOPRAM OXALATE 10 MG/1
5 TABLET ORAL DAILY
COMMUNITY
End: 2023-07-20 | Stop reason: SDUPTHER

## 2023-07-20 RX ORDER — ESCITALOPRAM OXALATE 5 MG/1
5 TABLET ORAL DAILY
Qty: 30 TABLET | Refills: 6 | Status: SHIPPED | OUTPATIENT
Start: 2023-07-20 | End: 2024-05-23 | Stop reason: ALTCHOICE

## 2023-07-20 RX ORDER — BUPROPION HYDROCHLORIDE 300 MG/1
1 TABLET ORAL DAILY
COMMUNITY
Start: 2021-11-11 | End: 2023-07-20 | Stop reason: ALTCHOICE

## 2023-07-20 NOTE — PROGRESS NOTES
Vera Nash is a 22 y.o. female here today for referral to psychiatrist. Previous psych reccommended to get thyroid checked.     HPI     Just moved back from [place] 2 months ago.  She is now living with her parents again in [place].  Dxed with PTSD and worsening depression.  Is on Lexapro  and quetiapine.  Lexapro 5 mg daily.   She was seeing a psychiatrist in [place] and they had decreased the Lexapro dose.  She was previously on Wellbutrin but this was discontinued because it did not seem to help.  Insomnia worse.  Sleeping about 5 hours per night.  Having panic attacks daily.  Feeling light heaeded and dizzy.  Especially with heat exposure.  She completely denies any suicidal ideation or intent.    She denies any chest pain or heart palpitations.  She has no symptoms of a DVT or pulmonary embolism on review.  She says that her episodes of lightheadedness and dizziness do not seem orthostatic in nature.  They are not related to eating.  She thinks they may be related to stress and anxiety but sometimes they can occur even when she does not feel anxious.    H/O hyperthyroidism in family.  She would like to have her labs checked to make sure her thyroid is okay.  She has been gaining some weight but she has also not been eating as well and has been less active.    No drug use for months.  No ETOH.      She previously had an IUD in place but it was removed recently because it had dislodged and become loose.  She is not currently sexually active and she says that she is ballesteros and only has sex with females.  She would like to see a gynecologist in the future to discuss possible IUD replacement versus other options.  She says her menstrual cycle is regular and monthly currently.      Current Outpatient Medications:     escitalopram (Lexapro) 5 mg tablet, Take 1 tablet (5 mg) by mouth once daily., Disp: 30 tablet, Rfl: 6    QUEtiapine (SEROquel) 50 mg tablet, Take 1 tablet (50 mg) by mouth once daily at  bedtime., Disp: 30 tablet, Rfl: 6    Patient Active Problem List   Diagnosis    Attention disturbance    Bipolar 2 disorder (CMS/HCC)    Chronic pain    Deep venous thrombosis of arm (CMS/HCC)    Depression with anxiety    Depression    Duodenitis    Homozygous Factor V Leiden mutation (CMS/HCC)    Irregular menses    IUD contraception    Lumbar paraspinal muscle spasm    Macromastia    Oligomenorrhea    Pain, upper back    Spinal arthritis    Tinea corporis    Vitamin D deficiency    Weight loss, unintentional    Borderline personality disorder (CMS/HCC)    Dizziness    Episodic lightheadedness    Encounter for gynecological examination without abnormal finding         Recent Results (from the past 2016 hour(s))   Comprehensive Metabolic Panel    Collection Time: 07/20/23  2:27 PM   Result Value Ref Range    Glucose 98 74 - 99 mg/dL    Sodium 140 136 - 145 mmol/L    Potassium 4.6 3.5 - 5.3 mmol/L    Chloride 107 98 - 107 mmol/L    Bicarbonate 23 21 - 32 mmol/L    Anion Gap 15 10 - 20 mmol/L    Urea Nitrogen 13 6 - 23 mg/dL    Creatinine 0.74 0.50 - 1.05 mg/dL    GFR Female >90 >90 mL/min/1.73m2    Calcium 9.5 8.6 - 10.3 mg/dL    Albumin 4.5 3.4 - 5.0 g/dL    Alkaline Phosphatase 70 33 - 110 U/L    Total Protein 6.7 6.4 - 8.2 g/dL    AST 11 9 - 39 U/L    Total Bilirubin 0.5 0.0 - 1.2 mg/dL    ALT (SGPT) 9 7 - 45 U/L   CBC    Collection Time: 07/20/23  2:27 PM   Result Value Ref Range    WBC 6.8 4.4 - 11.3 x10E9/L    RBC 4.63 4.00 - 5.20 x10E12/L    Hemoglobin 13.9 12.0 - 16.0 g/dL    Hematocrit 40.9 36.0 - 46.0 %    MCV 88 80 - 100 fL    MCHC 34.0 32.0 - 36.0 g/dL    Platelets 252 150 - 450 x10E9/L    RDW 11.9 11.5 - 14.5 %   TSH with reflex to Free T4 if abnormal    Collection Time: 07/20/23  2:27 PM   Result Value Ref Range    TSH 3.06 0.44 - 3.98 mIU/L   Vitamin D, Total    Collection Time: 07/20/23  2:27 PM   Result Value Ref Range    Vitamin D, 25-Hydroxy 15 (A) ng/mL   Vitamin B12    Collection Time: 07/20/23   "2:27 PM   Result Value Ref Range    Vitamin B-12 374 211 - 911 pg/mL        Objective    Visit Vitals  /70   Pulse 73   Temp 36.5 °C (97.7 °F)   Resp 16   Ht 1.702 m (5' 7\")   Wt 78.5 kg (173 lb)   BMI 27.10 kg/m²     Body mass index is 27.1 kg/m².     Physical Exam   General - Not in acute distress and cooperative.  Build & Nutrition - Well developed  Posture - Normal  Gait - Normal  Mental Status - alert and oriented x 3    Head - Normocephalic    Neck - Thyroid normal size    Eyes - Bilateral - Sclera clear and lids pink without edema or mass.      Skin - Warm and dry with no rashes on visible skin    Lungs - Clear to auscultation and normal breathing effort    Cardiovascular - RRR and no murmurs, rubs or thrill.    Peripheral Vascular - Bilateral - no edema present    Neuropsychiatric - normal mood and affect        Assessment    1. Dizziness  Comprehensive Metabolic Panel, CBC, TSH with reflex to Free T4 if abnormal, Vitamin D, Total, Vitamin B12, Ferritin   I am going to check some labs because of her episodes of dizziness.  She does not notice any correlation with eating or orthostatic changes.  This may be secondary to  anxiety but we are going to check further to make sure there is not an underlying problem.   2. Bipolar 2 disorder (CMS/HCC)  QUEtiapine (SEROquel) 50 mg tablet, Referral to Psychiatry   I will refer her to a female psychiatric nurse practitioner in the area.  She would prefer to see a female.  She is currently looking for a new counselor and she and her mom have made some calls to set up an appointment.   3. Depression with anxiety  escitalopram (Lexapro) 5 mg tablet, Referral to Psychiatry   I will continue her current medications at her request.   4. Episodic lightheadedness  Comprehensive Metabolic Panel, CBC, TSH with reflex to Free T4 if abnormal, Vitamin D, Total, Vitamin B12, Ferritin      5. Encounter for gynecological examination without abnormal finding  Referral to " Obstetrics / Gynecology   I will refer her to Dr. Watson for future gynecological care as above.

## 2023-07-21 ENCOUNTER — TELEPHONE (OUTPATIENT)
Dept: PRIMARY CARE | Facility: CLINIC | Age: 23
End: 2023-07-21
Payer: COMMERCIAL

## 2023-07-21 LAB
FERRITIN (UG/LL) IN SER/PLAS: 107 UG/L (ref 8–150)
THYROTROPIN (MIU/L) IN SER/PLAS BY DETECTION LIMIT <= 0.05 MIU/L: 3.06 MIU/L (ref 0.44–3.98)

## 2023-07-21 NOTE — TELEPHONE ENCOUNTER
----- Message from Kervin Gomez MD sent at 7/21/2023  9:39 AM EDT -----  Inform patient of normal results of all recent labs.   Her vitamin D was quite low at 15 so I recommend that she start taking over-the-counter vitamin D 2000 units daily and we will recheck this lab in the future.

## 2023-07-21 NOTE — RESULT ENCOUNTER NOTE
Inform patient of normal results of all recent labs.   Her vitamin D was quite low at 15 so I recommend that she start taking over-the-counter vitamin D 2000 units daily and we will recheck this lab in the future.

## 2023-08-11 ENCOUNTER — APPOINTMENT (OUTPATIENT)
Dept: PRIMARY CARE | Facility: CLINIC | Age: 23
End: 2023-08-11
Payer: COMMERCIAL

## 2023-10-09 ENCOUNTER — OFFICE VISIT (OUTPATIENT)
Dept: PRIMARY CARE | Facility: CLINIC | Age: 23
End: 2023-10-09
Payer: COMMERCIAL

## 2023-10-09 VITALS
RESPIRATION RATE: 16 BRPM | DIASTOLIC BLOOD PRESSURE: 80 MMHG | HEIGHT: 67 IN | SYSTOLIC BLOOD PRESSURE: 122 MMHG | TEMPERATURE: 97.6 F | BODY MASS INDEX: 28.72 KG/M2 | HEART RATE: 98 BPM | WEIGHT: 183 LBS

## 2023-10-09 DIAGNOSIS — H66.92 LEFT OTITIS MEDIA, UNSPECIFIED OTITIS MEDIA TYPE: ICD-10-CM

## 2023-10-09 DIAGNOSIS — J01.00 ACUTE NON-RECURRENT MAXILLARY SINUSITIS: Primary | ICD-10-CM

## 2023-10-09 PROCEDURE — 1036F TOBACCO NON-USER: CPT | Performed by: FAMILY MEDICINE

## 2023-10-09 PROCEDURE — 99214 OFFICE O/P EST MOD 30 MIN: CPT | Performed by: FAMILY MEDICINE

## 2023-10-09 RX ORDER — CEFDINIR 300 MG/1
300 CAPSULE ORAL 2 TIMES DAILY
Qty: 20 CAPSULE | Refills: 0 | Status: SHIPPED | OUTPATIENT
Start: 2023-10-09 | End: 2023-10-19

## 2023-10-09 NOTE — PROGRESS NOTES
"Vera Nash is a 23 y.o. female here today for   Chief Complaint   Patient presents with    Nasal Congestion     With sinus pressure     Earache     Right ear         HPI     10 days with URI sxs.  Now with increased sinus pressure and right-sided ear ache.  No fever or chills.  Minor cough.  Home Covid was negative.  Mom with sinusitis recently.  Patient is having a lot of pressure over her maxillary sinuses and chronic nasal congestion.        Current Outpatient Medications:     escitalopram (Lexapro) 5 mg tablet, Take 1 tablet (5 mg) by mouth once daily., Disp: 30 tablet, Rfl: 6    QUEtiapine (SEROquel) 50 mg tablet, Take 1 tablet (50 mg) by mouth once daily at bedtime., Disp: 30 tablet, Rfl: 6    cefdinir (Omnicef) 300 mg capsule, Take 1 capsule (300 mg) by mouth 2 times a day for 10 days., Disp: 20 capsule, Rfl: 0    Patient Active Problem List   Diagnosis    Attention disturbance    Bipolar 2 disorder (CMS/HCC)    Chronic pain    Deep venous thrombosis of arm (CMS/HCC)    Depression with anxiety    Depression    Duodenitis    Homozygous Factor V Leiden mutation (CMS/HCC)    Irregular menses    IUD contraception    Lumbar paraspinal muscle spasm    Macromastia    Oligomenorrhea    Pain, upper back    Spinal arthritis    Tinea corporis    Vitamin D deficiency    Weight loss, unintentional    Borderline personality disorder (CMS/HCC)    Dizziness    Episodic lightheadedness    Encounter for gynecological examination without abnormal finding    Acute upper respiratory infection    Otalgia    Acute non-recurrent maxillary sinusitis    Left otitis media         No results found for this or any previous visit (from the past 672 hour(s)).     Objective    Visit Vitals  /80   Pulse 98   Temp 36.4 °C (97.6 °F)   Resp 16   Ht 1.702 m (5' 7\")   Wt 83 kg (183 lb)   BMI 28.66 kg/m²     Body mass index is 28.66 kg/m².     Physical Exam   General -  Cooperative, Not in acute distress.    Skin - Warm and dry " with no rashes.    Eye - Bilateral - pupils equal and round, sclera clear and lids pink without edema or mass.  Sclera and conjunctiva - bilateral - Normal.    ENMT -bilateral tympanic membranes are red and dull the left is worse than the right.  Canals are normal.    Oropharynx - moist and pink, tonsils normal, no erythema noted.    Nose  - Nasal mucosa - no purulent discharge.    Sinuses -- Frontal - no tenderness observed.  Maxillary -bilateral tenderness observed.  Ethmoid - no tenderness observed.    Lungs - Clear to auscultation and normal breathing effort.  Even and easy respiratory effort with no use of accessory muscles.    Heart -- RRR and no murmurs, rubs or thrill    Lymphatic - Cervical nodes normal with no enlargement.    Assessment    1. Acute non-recurrent maxillary sinusitis  cefdinir (Omnicef) 300 mg capsule   Call or RTO if symptoms worsen or don't fully resolve.  Increase fluid intake.  Rest.  May use OTC symptomatic medications as needed at the appropriate dose.  We will treat with cefdinir 300 mg twice daily x10 days.     2. Left otitis media, unspecified otitis media type  cefdinir (Omnicef) 300 mg capsule

## 2023-11-15 ENCOUNTER — OFFICE VISIT (OUTPATIENT)
Dept: PRIMARY CARE | Facility: CLINIC | Age: 23
End: 2023-11-15
Payer: COMMERCIAL

## 2023-11-15 VITALS
HEART RATE: 101 BPM | HEIGHT: 67 IN | WEIGHT: 180 LBS | SYSTOLIC BLOOD PRESSURE: 122 MMHG | TEMPERATURE: 97.2 F | RESPIRATION RATE: 16 BRPM | BODY MASS INDEX: 28.25 KG/M2 | DIASTOLIC BLOOD PRESSURE: 80 MMHG

## 2023-11-15 DIAGNOSIS — J01.00 ACUTE NON-RECURRENT MAXILLARY SINUSITIS: Primary | ICD-10-CM

## 2023-11-15 DIAGNOSIS — F31.81 BIPOLAR 2 DISORDER (MULTI): ICD-10-CM

## 2023-11-15 DIAGNOSIS — H66.002 NON-RECURRENT ACUTE SUPPURATIVE OTITIS MEDIA OF LEFT EAR WITHOUT SPONTANEOUS RUPTURE OF TYMPANIC MEMBRANE: ICD-10-CM

## 2023-11-15 PROCEDURE — 1036F TOBACCO NON-USER: CPT | Performed by: FAMILY MEDICINE

## 2023-11-15 PROCEDURE — 99214 OFFICE O/P EST MOD 30 MIN: CPT | Performed by: FAMILY MEDICINE

## 2023-11-15 PROCEDURE — 90686 IIV4 VACC NO PRSV 0.5 ML IM: CPT | Performed by: FAMILY MEDICINE

## 2023-11-15 PROCEDURE — 90471 IMMUNIZATION ADMIN: CPT | Performed by: FAMILY MEDICINE

## 2023-11-15 RX ORDER — AZITHROMYCIN 250 MG/1
TABLET, FILM COATED ORAL
Qty: 6 TABLET | Refills: 0 | Status: SHIPPED | OUTPATIENT
Start: 2023-11-15 | End: 2023-11-20

## 2023-11-15 NOTE — PROGRESS NOTES
Vera Nash is a 23 y.o. female here today for   Chief Complaint   Patient presents with    Facial Pain     Sinus pressure    Earache        HPI   Patient with bilateral maxillary sinus pain and pressure.  Some yellow and thick dc.  No cough.  No fever.  Left ear pain.  Was given cefdinir 1 months ago and it did not clear.  Other family members are having similar symptoms.  There is no known COVID exposure but she has not recently tested for COVID.    Bipolar disorder-she would like a referral to new psychiatrist.  She is still taking her medications from her previous psychiatrist but she would like to establish with a psychiatrist in [facility].      Current Outpatient Medications:     escitalopram (Lexapro) 5 mg tablet, Take 1 tablet (5 mg) by mouth once daily., Disp: 30 tablet, Rfl: 6    QUEtiapine (SEROquel) 50 mg tablet, Take 1 tablet (50 mg) by mouth once daily at bedtime., Disp: 30 tablet, Rfl: 6    azithromycin (Zithromax) 250 mg tablet, Take 2 tablets (500 mg) by mouth once daily for 1 day, THEN 1 tablet (250 mg) once daily for 4 days., Disp: 6 tablet, Rfl: 0    Patient Active Problem List   Diagnosis    Attention disturbance    Bipolar 2 disorder (CMS/HCC)    Chronic pain    Deep venous thrombosis of arm (CMS/HCC)    Depression with anxiety    Depression    Duodenitis    Homozygous Factor V Leiden mutation (CMS/HCC)    Irregular menses    IUD contraception    Lumbar paraspinal muscle spasm    Macromastia    Oligomenorrhea    Pain, upper back    Spinal arthritis    Tinea corporis    Vitamin D deficiency    Weight loss, unintentional    Borderline personality disorder (CMS/HCC)    Dizziness    Episodic lightheadedness    Encounter for gynecological examination without abnormal finding    Acute upper respiratory infection    Otalgia    Acute non-recurrent maxillary sinusitis    Left otitis media         No results found for this or any previous visit (from the past 672 hour(s)).     Objective    Visit Vitals  BP  "122/80   Pulse 101   Temp 36.2 °C (97.2 °F)   Resp 16   Ht 1.702 m (5' 7\")   Wt 81.6 kg (180 lb)   BMI 28.19 kg/m²     Body mass index is 28.19 kg/m².     Physical Exam   General -  Cooperative, Not in acute distress.    Skin - Warm and dry with no rashes.    Eye - Bilateral - pupils equal and round, sclera clear and lids pink without edema or mass.  Sclera and conjunctiva - bilateral - Normal.    ENMT - Left -TM dull and pink centrally              Right -TM pearly grey with good light relflex and external auditory canal pink and dry.    Oropharynx - moist and pink, tonsils normal, no erythema noted.    Nose  - Nasal mucosa - no purulent discharge.    Sinuses -- Frontal - no tenderness observed.  Maxillary -bilateral tenderness observed.  Ethmoid - no tenderness observed.    Lungs - Clear to auscultation and normal breathing effort.  Even and easy respiratory effort with no use of accessory muscles.    Heart -- RRR and no murmurs, rubs or thrill    Lymphatic - Cervical nodes normal with no enlargement.    Assessment    1. Acute non-recurrent maxillary sinusitis  azithromycin (Zithromax) 250 mg tablet      2. Non-recurrent acute suppurative otitis media of left ear without spontaneous rupture of tympanic membrane  azithromycin (Zithromax) 250 mg tablet   This did not clear with cefdinir so we will try azithromycin.  Call or RTO if symptoms worsen or don't fully resolve.  Increase fluid intake.  Rest.  May use OTC symptomatic medications as needed at the appropriate dose.  I recommend that she do a home COVID test today and call us if it is positive.     3. Bipolar 2 disorder (CMS/[facility])  Referral to Psychiatry   At her request I will place a referral to a local psychiatrist for ongoing care.         "

## 2023-12-25 ENCOUNTER — HOSPITAL ENCOUNTER (EMERGENCY)
Facility: HOSPITAL | Age: 23
Discharge: HOME | End: 2023-12-25
Attending: STUDENT IN AN ORGANIZED HEALTH CARE EDUCATION/TRAINING PROGRAM
Payer: COMMERCIAL

## 2023-12-25 VITALS
DIASTOLIC BLOOD PRESSURE: 71 MMHG | WEIGHT: 175 LBS | OXYGEN SATURATION: 99 % | BODY MASS INDEX: 26.52 KG/M2 | HEART RATE: 87 BPM | RESPIRATION RATE: 18 BRPM | HEIGHT: 68 IN | SYSTOLIC BLOOD PRESSURE: 114 MMHG | TEMPERATURE: 98.6 F

## 2023-12-25 DIAGNOSIS — R11.2 NAUSEA AND VOMITING, UNSPECIFIED VOMITING TYPE: ICD-10-CM

## 2023-12-25 DIAGNOSIS — L03.213 PRESEPTAL CELLULITIS OF RIGHT EYE: Primary | ICD-10-CM

## 2023-12-25 LAB
FLUAV RNA RESP QL NAA+PROBE: NOT DETECTED
FLUBV RNA RESP QL NAA+PROBE: NOT DETECTED
RSV RNA RESP QL NAA+PROBE: NOT DETECTED
S PYO DNA THROAT QL NAA+PROBE: NOT DETECTED
SARS-COV-2 RNA RESP QL NAA+PROBE: NOT DETECTED

## 2023-12-25 PROCEDURE — 87637 SARSCOV2&INF A&B&RSV AMP PRB: CPT | Performed by: STUDENT IN AN ORGANIZED HEALTH CARE EDUCATION/TRAINING PROGRAM

## 2023-12-25 PROCEDURE — 2500000005 HC RX 250 GENERAL PHARMACY W/O HCPCS: Performed by: STUDENT IN AN ORGANIZED HEALTH CARE EDUCATION/TRAINING PROGRAM

## 2023-12-25 PROCEDURE — 99284 EMERGENCY DEPT VISIT MOD MDM: CPT | Performed by: STUDENT IN AN ORGANIZED HEALTH CARE EDUCATION/TRAINING PROGRAM

## 2023-12-25 PROCEDURE — 87651 STREP A DNA AMP PROBE: CPT | Performed by: STUDENT IN AN ORGANIZED HEALTH CARE EDUCATION/TRAINING PROGRAM

## 2023-12-25 PROCEDURE — 99283 EMERGENCY DEPT VISIT LOW MDM: CPT | Performed by: STUDENT IN AN ORGANIZED HEALTH CARE EDUCATION/TRAINING PROGRAM

## 2023-12-25 PROCEDURE — 2500000001 HC RX 250 WO HCPCS SELF ADMINISTERED DRUGS (ALT 637 FOR MEDICARE OP): Performed by: STUDENT IN AN ORGANIZED HEALTH CARE EDUCATION/TRAINING PROGRAM

## 2023-12-25 RX ORDER — ONDANSETRON 4 MG/1
4 TABLET, ORALLY DISINTEGRATING ORAL EVERY 8 HOURS PRN
Qty: 15 TABLET | Refills: 0 | Status: SHIPPED | OUTPATIENT
Start: 2023-12-25 | End: 2023-12-25 | Stop reason: SDUPTHER

## 2023-12-25 RX ORDER — ONDANSETRON 4 MG/1
4 TABLET, ORALLY DISINTEGRATING ORAL EVERY 8 HOURS PRN
Qty: 15 TABLET | Refills: 0 | Status: SHIPPED | OUTPATIENT
Start: 2023-12-25 | End: 2024-01-08 | Stop reason: ALTCHOICE

## 2023-12-25 RX ORDER — ONDANSETRON 4 MG/1
4 TABLET, ORALLY DISINTEGRATING ORAL ONCE
Status: COMPLETED | OUTPATIENT
Start: 2023-12-25 | End: 2023-12-25

## 2023-12-25 RX ORDER — CLINDAMYCIN HYDROCHLORIDE 150 MG/1
300 CAPSULE ORAL 3 TIMES DAILY
Qty: 42 CAPSULE | Refills: 0 | Status: SHIPPED | OUTPATIENT
Start: 2023-12-25 | End: 2023-12-25 | Stop reason: SDUPTHER

## 2023-12-25 RX ORDER — CLINDAMYCIN HYDROCHLORIDE 150 MG/1
300 CAPSULE ORAL ONCE
Status: COMPLETED | OUTPATIENT
Start: 2023-12-25 | End: 2023-12-25

## 2023-12-25 RX ORDER — CLINDAMYCIN HYDROCHLORIDE 150 MG/1
300 CAPSULE ORAL 3 TIMES DAILY
Qty: 42 CAPSULE | Refills: 0 | Status: SHIPPED | OUTPATIENT
Start: 2023-12-25 | End: 2024-01-01

## 2023-12-25 RX ADMIN — CLINDAMYCIN HYDROCHLORIDE 300 MG: 150 CAPSULE ORAL at 06:48

## 2023-12-25 RX ADMIN — ONDANSETRON 4 MG: 4 TABLET, ORALLY DISINTEGRATING ORAL at 06:48

## 2023-12-25 ASSESSMENT — PAIN - FUNCTIONAL ASSESSMENT
PAIN_FUNCTIONAL_ASSESSMENT: 0-10
PAIN_FUNCTIONAL_ASSESSMENT: 0-10

## 2023-12-25 ASSESSMENT — LIFESTYLE VARIABLES
HAVE YOU EVER FELT YOU SHOULD CUT DOWN ON YOUR DRINKING: NO
REASON UNABLE TO ASSESS: NO
EVER HAD A DRINK FIRST THING IN THE MORNING TO STEADY YOUR NERVES TO GET RID OF A HANGOVER: NO
HAVE PEOPLE ANNOYED YOU BY CRITICIZING YOUR DRINKING: NO
EVER FELT BAD OR GUILTY ABOUT YOUR DRINKING: NO

## 2023-12-25 ASSESSMENT — PAIN DESCRIPTION - PROGRESSION: CLINICAL_PROGRESSION: NOT CHANGED

## 2023-12-25 ASSESSMENT — PAIN SCALES - GENERAL
PAINLEVEL_OUTOF10: 3
PAINLEVEL_OUTOF10: 6

## 2023-12-25 ASSESSMENT — PAIN DESCRIPTION - LOCATION: LOCATION: EYE

## 2023-12-25 ASSESSMENT — COLUMBIA-SUICIDE SEVERITY RATING SCALE - C-SSRS
6. HAVE YOU EVER DONE ANYTHING, STARTED TO DO ANYTHING, OR PREPARED TO DO ANYTHING TO END YOUR LIFE?: NO
2. HAVE YOU ACTUALLY HAD ANY THOUGHTS OF KILLING YOURSELF?: NO
1. IN THE PAST MONTH, HAVE YOU WISHED YOU WERE DEAD OR WISHED YOU COULD GO TO SLEEP AND NOT WAKE UP?: NO

## 2023-12-25 NOTE — ED PROVIDER NOTES
EMERGENCY DEPARTMENT ENCOUNTER      Pt Name: Vera Nash  MRN: [id number]  Birthdate 2000  Date of evaluation: 12/25/2023  Provider: Kervin Nava DO    CHIEF COMPLAINT       Chief Complaint   Patient presents with    Eye Drainage    Vomiting    Nausea    Nasal Congestion    Flu Symptoms         HISTORY OF PRESENT ILLNESS    HPI  Patient is a 23-year-old female presenting with concerns for conjunctivitis and nausea.  She notes URI-like symptoms intermittently for the past week.  Cough, runny nose, congestion.  She had mild nausea with an episode of vomiting this morning.  She vomited into the toilet bowl and had it splashed back into her right eye.  Since then she has noticed increasing swelling and drainage from the eye.  It is nearly swollen shut at this point.  She has no pain with extraocular movements.  She is afebrile.    Nursing Notes were reviewed.    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    Homozygous Factor V Leiden mutation    Acute sinusitis 07/20/2023    Acute upper respiratory infection, unspecified 02/21/2016    Acute URI    Acute urticaria 07/20/2023    Body aches 07/20/2023    Contusion of right elbow, initial encounter 02/21/2016    Contusion of elbow, right    Contusion of unspecified foot, initial encounter 02/21/2016    Contusion, foot    Contusion of unspecified lesser toe(s) without damage to nail, initial encounter 12/12/2013    Contusion of toe    COVID-19 virus infection 07/20/2023    Encounter for immunization 10/11/2017    Need for HPV vaccine    Encounter for immunization 08/04/2019    Need for HPV vaccination    Encounter for surveillance of implantable subdermal contraceptive 11/01/2017    Encounter for Nexplanon removal    Insomnia, unspecified 12/12/2013    Insomnia    Low back pain 07/20/2023    Muscle weakness 07/20/2023    Noninfective gastroenteritis and colitis, unspecified 09/26/2017     Gastroenteritis, acute    Pelvic and perineal pain 08/29/2017    Chronic pelvic pain in female    Personal history of other diseases of the digestive system 04/09/2018    History of acute gastritis    Personal history of other diseases of the female genital tract 12/30/2016    History of irregular menstrual cycles    Personal history of other diseases of the respiratory system 02/21/2016    History of acute sinusitis    Personal history of other drug therapy 02/12/2018    History of influenza vaccination    Personal history of other infectious and parasitic diseases 09/11/2017    History of viral infection    Personal history of other specified conditions 01/08/2018    History of abdominal pain    Personal history of other specified conditions 09/11/2017    History of urinary frequency    Personal history of other specified conditions 02/13/2018    History of nausea and vomiting    Personal history of suicidal behavior     History of suicide attempt    Right upper quadrant pain 02/15/2018    Abdominal pain, RUQ (right upper quadrant)    Sacrococcygeal disorders, not elsewhere classified 08/09/2017    Sacroiliac pain    Strain of thoracic region 07/20/2023    Streptococcal pharyngitis 12/12/2013    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         SURGICAL HISTORY       Past Surgical History:   Procedure Laterality Date    OTHER SURGICAL HISTORY  07/18/2017    Oral Surgery Tooth Extraction [employer] Tooth    OTHER SURGICAL HISTORY  02/25/2021    Exploratory laparoscopy    OTHER SURGICAL HISTORY  02/25/2021    Endoscopy    TONSILLECTOMY  10/06/2014    Tonsillectomy With Adenoidectomy         CURRENT MEDICATIONS       Discharge Medication List as of 12/25/2023  8:16 AM        CONTINUE these medications which have NOT CHANGED    Details   escitalopram (Lexapro) 5 mg tablet Take 1 tablet (5 mg) by mouth once daily., Starting Thu 7/20/2023, Normal      QUEtiapine (SEROquel) 50 mg tablet Take 1 tablet (50 mg) by  mouth once daily at bedtime., Starting Thu 7/20/2023, Normal             ALLERGIES     Patient has no known allergies.    FAMILY HISTORY     No family history on file.       SOCIAL HISTORY       Social History     Socioeconomic History    Marital status: Single     Spouse name: None    Number of children: None    Years of education: None    Highest education level: None   Occupational History    None   Tobacco Use    Smoking status: Never    Smokeless tobacco: Never   Vaping Use    Vaping Use: Former   Substance and Sexual Activity    Alcohol use: Yes     Comment: rare    Drug use: Not Currently     Types: Marijuana    Sexual activity: None   Other Topics Concern    None   Social History Narrative    None     Social Determinants of Health     Financial Resource Strain: Not on file   Food Insecurity: Not on file   Transportation Needs: Not on file   Physical Activity: Not on file   Stress: Not on file   Social Connections: Not on file   Intimate Partner Violence: Not on file   Housing Stability: Not on file       SCREENINGS                        PHYSICAL EXAM    (up to 7 for level 4, 8 or more for level 5)     ED Triage Vitals [12/25/23 0214]   Temp Heart Rate Resp BP   37 °C (98.6 °F) 90 18 124/84      SpO2 Temp Source Heart Rate Source Patient Position   97 % Temporal Monitor Sitting      BP Location FiO2 (%)     Right arm --       Physical Exam  Vitals reviewed.   Constitutional:       General: She is not in acute distress.     Appearance: She is not toxic-appearing.   HENT:      Head: Normocephalic and atraumatic.      Nose: Congestion present. No rhinorrhea.      Mouth/Throat:      Mouth: Mucous membranes are moist.      Pharynx: Oropharynx is clear.   Eyes:      General:         Right eye: Discharge present.      Extraocular Movements: Extraocular movements intact.      Comments: Right eye injected.   Cardiovascular:      Rate and Rhythm: Normal rate and regular rhythm.      Heart sounds: Normal heart sounds.    Pulmonary:      Effort: Pulmonary effort is normal. No respiratory distress.      Breath sounds: Normal breath sounds.   Abdominal:      General: Abdomen is flat. There is no distension.      Palpations: Abdomen is soft.      Tenderness: There is no abdominal tenderness.   Musculoskeletal:         General: No swelling, deformity or signs of injury. Normal range of motion.      Cervical back: Normal range of motion and neck supple.   Skin:     General: Skin is warm and dry.   Neurological:      General: No focal deficit present.      Mental Status: Mental status is at baseline.          DIAGNOSTIC RESULTS     LABS:  Labs Reviewed   GROUP A STREPTOCOCCUS, PCR - Normal       Result Value    Group A Strep PCR Not Detected     SARS-COV-2 AND INFLUENZA A/B PCR - Normal    Flu A Result Not Detected      Flu B Result Not Detected      Coronavirus 2019, PCR Not Detected      Narrative:     This assay has received FDA Emergency Use Authorization (EUA) and  is only authorized for the duration of time that circumstances exist to justify the authorization of the emergency use of in vitro diagnostic tests for the detection of SARS-CoV-2 virus and/or diagnosis of COVID-19 infection under section 564(b)(1) of the Act, 21 U.S.C. 360bbb-3(b)(1). Testing for SARS-CoV-2 is only recommended for patients who meet current clinical and/or epidemiological criteria as defined by federal, state, or local public health directives. This assay is an in vitro diagnostic nucleic acid amplification test for the qualitative detection of SARS-CoV-2, Influenza A, and Influenza B from nasopharyngeal specimens and has been validated for use at [facility]. Negative results do not preclude COVID-19 infections or Influenza A/B infections, and should not be used as the sole basis for diagnosis, treatment, or other management decisions. If Influenza A/B and RSV PCR results are negative, testing for Parainfluenza virus, Adenovirus  and Metapneumovirus is routinely performed for [facility] pediatric oncology and intensive care inpatients, and is available on other patients by placing an add-on request.    RSV PCR - Normal    RSV PCR Not Detected      Narrative:     This assay is an FDA-cleared, in vitro diagnostic nucleic acid amplification test for the detection of RSV from nasopharyngeal specimens, and has been validated for use at [facility]. Negative results do not preclude RSV infections, and should not be used as the sole basis for diagnosis, treatment, or other management decisions. If Influenza A/B and RSV PCR results are negative, testing for Parainfluenza virus, Adenovirus and Metapneumovirus is routinely performed for pediatric oncology and intensive care inpatients at [facility], and is available on other patients by placing an add-on request.           All other labs were within normal range or not returned as of this dictation.    Imaging  No orders to display        Procedures  Procedures     EMERGENCY DEPARTMENT COURSE/MDM:     Diagnoses as of 12/28/23 1741   Preseptal cellulitis of right eye   Nausea and vomiting, unspecified vomiting type        Medical Decision Making  History obtained for the patient.  Records including labs, imaging, notes reviewed.  Symptoms consistent with underlying URI.  Swabs are negative for RSV, COVID, influenza AMB.  Likely an unspecified virus.  Swelling of the right eye was rather profound without evidence of pain on extraocular movements consistent with preseptal cellulitis.  Imaging was deferred.  Given the severity of swelling, she was given oral clindamycin.  She is also given a dose of Zofran after which she passed a p.o. challenge and was subsequently discharged home in satisfactory condition with prescriptions for clindamycin and Zofran.  All questions answered and return precautions discussed.    Patient and or family in agreement and understanding of treatment plan.  All  questions answered.      I reviewed the case with the attending ED physician. The attending ED physician agrees with the plan. Patient and/or patient´s representative was counseled regarding labs, imaging, likely diagnosis, and plan. All questions were answered.    ED Medications administered this visit:    Medications   clindamycin (Cleocin) capsule 300 mg (300 mg oral Given 12/25/23 0648)   ondansetron ODT (Zofran-ODT) disintegrating tablet 4 mg (4 mg oral Given 12/25/23 0648)        Final Impression:   1. Preseptal cellulitis of right eye    2. Nausea and vomiting, unspecified vomiting type          (Please note that portions of this note were completed with a voice recognition program.  Efforts were made to edit the dictations but occasionally words are mis-transcribed.)     Grupo Roth MD  Resident  12/25/23 0700  --------------------------  I did evaluate the patient independently from the resident and was present for key medical decision making.  Agree with disposition.  Any discrepancies between residents findings are detailed below.    In short this is a 23-year-old female presenting to the emergency department with father for right eye swelling as well as nausea and vomiting.  Patient reports that the emesis was clear to yellow in color and has since resolved.  She is still slightly nauseated.  She does report that she has had nonproductive cough as well as some nasal congestion.  She denies any abdominal pain and has been tolerating p.o. since.  Denies any urinary symptoms or vaginal discharge low concern for STDs or pregnancy.  Her physical exam reveals TMs clear bilaterally nares clear bilaterally no oral lesions no tonsillar exudates or stones no evidence of PTA or Markus's.  Cranial nerves II through XII are intact right sclera is injected the upper and lower eyelids are swollen there is no pain with ocular motion no diplopia with ocular motion.  There is no exophthalmos.  Nothing consistent with  a orbital cellulitis although does appear to be consistent with a preseptal cellulitis.  Suspect she may have contracted an infection when she was throwing up in the toilet having the water splashed into her eye.  She has clear lung sounds auscultatory exam no respiratory distress no indication for chest x-ray.  Abdomen soft nontender Henning sign McBurney's point tenderness is negative no CVA tenderness.  No indication for imaging of her abdomen.  She is tolerating p.o. at this point appears well-hydrated no indication for lab work either.  Patient to be placed on clindamycin for suspected preseptal cellulitis.  She is also moving homes going Zofran as needed for breakthrough nausea.  She was recommended follow-up with her primary physician in the coming days she was given strict return precautions and subsequently discharged in stable condition.  Patient is appreciative of care and agreeable with plan.    Kervin Nava, DO Kervin Nava,   12/28/23 4960

## 2023-12-25 NOTE — DISCHARGE INSTRUCTIONS
You were evaluated for multiple complaints including right eye swelling and nausea.  We have sent prescriptions for Zofran and clindamycin to your pharmacy for you for vomiting and the eye infection respectively.  Please take as prescribed.  If you develop pain with movement of the eye, worsening vision, or other worrisome symptoms, return to the emergency department.

## 2023-12-27 ENCOUNTER — APPOINTMENT (OUTPATIENT)
Dept: OBSTETRICS AND GYNECOLOGY | Facility: CLINIC | Age: 23
End: 2023-12-27
Payer: COMMERCIAL

## 2024-01-08 ENCOUNTER — OFFICE VISIT (OUTPATIENT)
Dept: PRIMARY CARE | Facility: CLINIC | Age: 24
End: 2024-01-08
Payer: COMMERCIAL

## 2024-01-08 VITALS
TEMPERATURE: 97 F | DIASTOLIC BLOOD PRESSURE: 70 MMHG | WEIGHT: 179 LBS | RESPIRATION RATE: 18 BRPM | HEART RATE: 82 BPM | BODY MASS INDEX: 28.09 KG/M2 | OXYGEN SATURATION: 97 % | HEIGHT: 67 IN | SYSTOLIC BLOOD PRESSURE: 108 MMHG

## 2024-01-08 DIAGNOSIS — J32.0 CHRONIC SINUSITIS OF BOTH MAXILLARY SINUSES: Primary | ICD-10-CM

## 2024-01-08 DIAGNOSIS — L03.213 PRESEPTAL CELLULITIS OF RIGHT EYE: ICD-10-CM

## 2024-01-08 PROBLEM — H66.92 LEFT OTITIS MEDIA: Status: RESOLVED | Noted: 2023-10-09 | Resolved: 2024-01-08

## 2024-01-08 PROBLEM — Z86.718 H/O DEEP VENOUS THROMBOSIS: Status: ACTIVE | Noted: 2023-07-20

## 2024-01-08 PROBLEM — M62.830 LUMBAR PARASPINAL MUSCLE SPASM: Status: RESOLVED | Noted: 2023-07-20 | Resolved: 2024-01-08

## 2024-01-08 PROBLEM — R42 DIZZINESS: Status: RESOLVED | Noted: 2023-07-20 | Resolved: 2024-01-08

## 2024-01-08 PROCEDURE — 99214 OFFICE O/P EST MOD 30 MIN: CPT | Performed by: FAMILY MEDICINE

## 2024-01-08 PROCEDURE — 1036F TOBACCO NON-USER: CPT | Performed by: FAMILY MEDICINE

## 2024-01-08 RX ORDER — FLUTICASONE PROPIONATE 50 MCG
1 SPRAY, SUSPENSION (ML) NASAL DAILY
Qty: 16 G | Refills: 1 | Status: SHIPPED | OUTPATIENT
Start: 2024-01-08

## 2024-01-08 RX ORDER — CLINDAMYCIN HYDROCHLORIDE 150 MG/1
150 CAPSULE ORAL 3 TIMES DAILY
Qty: 30 CAPSULE | Refills: 0 | Status: SHIPPED | OUTPATIENT
Start: 2024-01-08 | End: 2024-01-17 | Stop reason: ALTCHOICE

## 2024-01-08 RX ORDER — OFLOXACIN 3 MG/ML
2 SOLUTION/ DROPS OPHTHALMIC 2 TIMES DAILY
Qty: 10 ML | Refills: 0 | Status: SHIPPED | OUTPATIENT
Start: 2024-01-08 | End: 2024-01-18

## 2024-01-08 NOTE — PROGRESS NOTES
Vera Nash is a 23 y.o. female here today for   Chief Complaint   Patient presents with    Anxiety    Depression    Bipolar    Sinus Problem    Cellulitis      Mood disorder recheck -- Patient feels like condition is well controlled.    I referred her to a new psychiatrist at her last visit and she has an appointment later this month..      She also complains of sinus congestion and pressure.  Went to ER on 12/25 and put on clindamycin for preseptal cellulitis of right eye.  Sinuses still with pressure and thick yellow green dc.  No F/C.  No cough.  No ST.  Sinuses have not fully cleared since September even with 3 atbc courses.  Many others at home with same sxs off and on.  She does not smoke and does not vape.  She has a history of tonsillectomy and adenoidectomy in the past.  She is not currently on any steroid nasal sprays.  She was tested for COVID and flu and RSV and these were all negative.        Current Outpatient Medications:     escitalopram (Lexapro) 5 mg tablet, Take 1 tablet (5 mg) by mouth once daily., Disp: 30 tablet, Rfl: 6    QUEtiapine (SEROquel) 50 mg tablet, Take 1 tablet (50 mg) by mouth once daily at bedtime., Disp: 30 tablet, Rfl: 6    clindamycin (Cleocin) 150 mg capsule, Take 1 capsule (150 mg) by mouth 3 times a day for 10 days., Disp: 30 capsule, Rfl: 0    fluticasone (Flonase) 50 mcg/actuation nasal spray, Administer 1 spray into each nostril once daily. Shake gently. Before first use, prime pump. After use, clean tip and replace cap., Disp: 16 g, Rfl: 1    ofloxacin (Ocuflox) 0.3 % ophthalmic solution, Administer 2 drops into the right eye 2 times a day for 10 days., Disp: 10 mL, Rfl: 0    Patient Active Problem List   Diagnosis    Attention disturbance    Bipolar 2 disorder (CMS/HCC)    Chronic pain    H/O deep venous thrombosis    Depression with anxiety    Duodenitis    Homozygous Factor V Leiden mutation (CMS/HCC)    Irregular menses    IUD contraception    Macromastia     "Oligomenorrhea    Pain, upper back    Spinal arthritis    Tinea corporis    Vitamin D deficiency    Weight loss, unintentional    Borderline personality disorder (CMS/HCC)    Episodic lightheadedness    Encounter for gynecological examination without abnormal finding    Acute non-recurrent maxillary sinusitis    Chronic sinusitis of both maxillary sinuses    Preseptal cellulitis of right eye         Recent Results (from the past 672 hour(s))   Sars-CoV-2 and Influenza A/B PCR    Collection Time: 12/25/23  2:18 AM   Result Value Ref Range    Flu A Result Not Detected Not Detected    Flu B Result Not Detected Not Detected    Coronavirus 2019, PCR Not Detected Not Detected   RSV PCR    Collection Time: 12/25/23  2:18 AM   Result Value Ref Range    RSV PCR Not Detected Not Detected   Group A Streptococcus, PCR    Collection Time: 12/25/23  2:18 AM    Specimen: Throat/Pharynx; Swab   Result Value Ref Range    Group A Strep PCR Not Detected Not Detected        Objective    Visit Vitals  /70   Pulse 82   Temp 36.1 °C (97 °F)   Resp 18   Ht 1.702 m (5' 7\")   Wt 81.2 kg (179 lb)   SpO2 97%   BMI 28.04 kg/m²     Body mass index is 28.04 kg/m².     Physical Exam   General -  Cooperative, Not in acute distress.    Skin - Warm and dry with no rashes.    Eye - Bilateral - pupils equal and round, sclera clear and lids pink without edema or mass.  Sclera and conjunctiva - bilateral - Normal.    ENMT - Left -TM pearly grey with good light reflex and externa auditory canal pink and dry.              Right -TM pearly grey with good light relflex and external auditory canal pink and dry.    Oropharynx - moist and pink, tonsils normal, no erythema noted.    Nose  - Nasal mucosa - no purulent discharge but diffusely erythematous and swollen.    Sinuses -- Frontal - no tenderness observed.  Maxillary -bilateral tenderness observed.  Ethmoid - no tenderness observed.    Lungs - Clear to auscultation and normal breathing " effort.  Even and easy respiratory effort with no use of accessory muscles.    Heart -- RRR and no murmurs, rubs or thrill    Lymphatic - Cervical nodes normal with no enlargement.    Assessment    1. Chronic sinusitis of both maxillary sinuses  Referral to ENT, clindamycin (Cleocin) 150 mg capsule, fluticasone (Flonase) 50 mcg/actuation nasal spray   She has had chronic sinus issues since September and her symptoms of sinusitis have not fully cleared.  I am going to restart her on clindamycin x 10 days.  Will also start fluticasone every day and I will refer her to ENT for further evaluation.     2. Preseptal cellulitis of right eye  clindamycin (Cleocin) 150 mg capsule, ofloxacin (Ocuflox) 0.3 % ophthalmic solution   The clindamycin should help to fully clear this but I will also add ofloxacin eyedrops.

## 2024-01-17 ENCOUNTER — OFFICE VISIT (OUTPATIENT)
Dept: PRIMARY CARE | Facility: CLINIC | Age: 24
End: 2024-01-17
Payer: COMMERCIAL

## 2024-01-17 VITALS
TEMPERATURE: 97.8 F | SYSTOLIC BLOOD PRESSURE: 112 MMHG | DIASTOLIC BLOOD PRESSURE: 62 MMHG | RESPIRATION RATE: 18 BRPM | WEIGHT: 179 LBS | HEART RATE: 82 BPM | HEIGHT: 67 IN | BODY MASS INDEX: 28.09 KG/M2

## 2024-01-17 DIAGNOSIS — J01.00 ACUTE NON-RECURRENT MAXILLARY SINUSITIS: Primary | ICD-10-CM

## 2024-01-17 PROCEDURE — 99214 OFFICE O/P EST MOD 30 MIN: CPT | Performed by: FAMILY MEDICINE

## 2024-01-17 PROCEDURE — 1036F TOBACCO NON-USER: CPT | Performed by: FAMILY MEDICINE

## 2024-01-17 RX ORDER — AMOXICILLIN AND CLAVULANATE POTASSIUM 875; 125 MG/1; MG/1
1 TABLET, FILM COATED ORAL 2 TIMES DAILY
Qty: 20 TABLET | Refills: 0 | Status: SHIPPED | OUTPATIENT
Start: 2024-01-17 | End: 2024-01-27

## 2024-01-17 ASSESSMENT — ENCOUNTER SYMPTOMS
SINUS PRESSURE: 1
SINUS COMPLAINT: 1
COUGH: 0

## 2024-01-17 NOTE — PROGRESS NOTES
Vera Nash is a 23 y.o. female here today for   Chief Complaint   Patient presents with    Sinus Problem   ENT appt is not until next week      Sinus Problem  This is a recurrent problem. The current episode started in the past 7 days. The problem is unchanged. Associated symptoms include congestion and sinus pressure. Pertinent negatives include no coughing or sneezing.      Patient finished the oral clindamycin but is still having sinus pain and pressure.  She is having some yellow thick nasal discharge still.  There is pressure over her maxillary sinuses and also some in her left ear.  Her eyes seem to improve improved but there is still some mild crusting and irritation at times.  She is still taking the antibiotic eyedrops.      Current Outpatient Medications:     escitalopram (Lexapro) 5 mg tablet, Take 1 tablet (5 mg) by mouth once daily., Disp: 30 tablet, Rfl: 6    fluticasone (Flonase) 50 mcg/actuation nasal spray, Administer 1 spray into each nostril once daily. Shake gently. Before first use, prime pump. After use, clean tip and replace cap., Disp: 16 g, Rfl: 1    ofloxacin (Ocuflox) 0.3 % ophthalmic solution, Administer 2 drops into the right eye 2 times a day for 10 days., Disp: 10 mL, Rfl: 0    QUEtiapine (SEROquel) 50 mg tablet, Take 1 tablet (50 mg) by mouth once daily at bedtime., Disp: 30 tablet, Rfl: 6    amoxicillin-pot clavulanate (Augmentin) 875-125 mg tablet, Take 1 tablet (875 mg) by mouth 2 times a day for 10 days., Disp: 20 tablet, Rfl: 0    Patient Active Problem List   Diagnosis    Attention disturbance    Bipolar 2 disorder (CMS/HCC)    Chronic pain    H/O deep venous thrombosis    Depression with anxiety    Duodenitis    Homozygous Factor V Leiden mutation (CMS/HCC)    Irregular menses    IUD contraception    Macromastia    Oligomenorrhea    Pain, upper back    Spinal arthritis    Tinea corporis    Vitamin D deficiency    Weight loss, unintentional    Borderline personality disorder  "(CMS/[facility])    Episodic lightheadedness    Encounter for gynecological examination without abnormal finding    Acute non-recurrent maxillary sinusitis    Chronic sinusitis of both maxillary sinuses    Preseptal cellulitis of right eye         Recent Results (from the past 672 hour(s))   Sars-CoV-2 and Influenza A/B PCR    Collection Time: 12/25/23  2:18 AM   Result Value Ref Range    Flu A Result Not Detected Not Detected    Flu B Result Not Detected Not Detected    Coronavirus 2019, PCR Not Detected Not Detected   RSV PCR    Collection Time: 12/25/23  2:18 AM   Result Value Ref Range    RSV PCR Not Detected Not Detected   Group A Streptococcus, PCR    Collection Time: 12/25/23  2:18 AM    Specimen: Throat/Pharynx; Swab   Result Value Ref Range    Group A Strep PCR Not Detected Not Detected        Objective    Visit Vitals  /62   Pulse 82   Temp 36.6 °C (97.8 °F)   Resp 18   Ht 1.702 m (5' 7\")   Wt 81.2 kg (179 lb)   BMI 28.04 kg/m²     Body mass index is 28.04 kg/m².     Physical Exam   General -  Cooperative, Not in acute distress.    Skin - Warm and dry with no rashes.    Eye - Bilateral - pupils equal and round, sclera clear and lids pink without edema or mass.  Sclera and conjunctiva - bilateral - Normal.    ENMT - Left -TM pearly grey with good light reflex and externa auditory canal pink and dry.              Right -TM pearly grey with good light relflex and external auditory canal pink and dry.    Oropharynx - moist and pink, tonsils normal, no erythema noted.    Nose  - Nasal mucosa - no purulent discharge.    Sinuses -- Frontal - no tenderness observed.  Maxillary -bilateral tenderness observed.  Ethmoid - no tenderness observed.    Lungs - Clear to auscultation and normal breathing effort.  Even and easy respiratory effort with no use of accessory muscles.    Heart -- RRR and no murmurs, rubs or thrill    Lymphatic - Cervical nodes normal with no enlargement.    Assessment    1. Acute " non-recurrent maxillary sinusitis  amoxicillin-pot clavulanate (Augmentin) 875-125 mg tablet   This did not clear with clindamycin so we will try Augmentin x 10 days.  Call or RTO if symptoms worsen or don't fully resolve.  Increase fluid intake.  Rest.  May use OTC symptomatic medications as needed at the appropriate dose.

## 2024-02-02 ENCOUNTER — APPOINTMENT (OUTPATIENT)
Dept: OBSTETRICS AND GYNECOLOGY | Facility: CLINIC | Age: 24
End: 2024-02-02
Payer: COMMERCIAL

## 2024-02-05 ENCOUNTER — TELEMEDICINE (OUTPATIENT)
Dept: PRIMARY CARE | Facility: CLINIC | Age: 24
End: 2024-02-05
Payer: COMMERCIAL

## 2024-02-05 DIAGNOSIS — J32.9 RECURRENT SINUS INFECTIONS: Primary | ICD-10-CM

## 2024-02-05 PROCEDURE — 99213 OFFICE O/P EST LOW 20 MIN: CPT | Performed by: FAMILY MEDICINE

## 2024-02-05 RX ORDER — ESCITALOPRAM OXALATE 10 MG/1
15 TABLET ORAL DAILY
COMMUNITY
Start: 2024-01-22

## 2024-02-05 RX ORDER — HYDROXYZINE HYDROCHLORIDE 50 MG/1
TABLET, FILM COATED ORAL
COMMUNITY
Start: 2024-01-22

## 2024-02-05 NOTE — PROGRESS NOTES
Vera Nash is a 23 y.o. female here today for recurrent sinus problems.    This visit was completed via telehealth due to the restrictions of the COVID-19 pandemic. All issues as below were discussed and addressed but no physical exam was performed. If it was felt the patient should be evaluated in clinic then they were directed there. The patient verbally consented to this visit.        HPI     Patient finished augmentin for acute sinusitis.  Has appt with ENT in 2 weeks.  Sxs did improve and nearly resolve.  But now with nose congestion with yellow nasal discharge.  No fever.  Some ear pressure.  No cough.  No sinus pain but some tenderness over maxillary.  She did finish a full course of Augmentin and prior to that she was on a full course of clindamycin.  She has also been taking fluticasone nasal spray daily.  No one else sick at home with similar symptoms.  She says her symptoms are currently mild but she is worried that the sinus infection may come back.  She has no previous history of recurrent sinus infections.      Current Outpatient Medications:     escitalopram (Lexapro) 10 mg tablet, Take 1.5 tablets (15 mg) by mouth once daily. Take 1 and 1/2 (one and one-half) tablets by mouth daily., Disp: , Rfl:     hydrOXYzine HCL (Atarax) 50 mg tablet, TAKE 1 TABLET BY MOUTH FOUR TIMES DAILY WITH MEALS AS NEEDED, Disp: , Rfl:     escitalopram (Lexapro) 5 mg tablet, Take 1 tablet (5 mg) by mouth once daily., Disp: 30 tablet, Rfl: 6    fluticasone (Flonase) 50 mcg/actuation nasal spray, Administer 1 spray into each nostril once daily. Shake gently. Before first use, prime pump. After use, clean tip and replace cap., Disp: 16 g, Rfl: 1    QUEtiapine (SEROquel) 50 mg tablet, Take 1 tablet (50 mg) by mouth once daily at bedtime., Disp: 30 tablet, Rfl: 6    Patient Active Problem List   Diagnosis    Attention disturbance    Bipolar 2 disorder (CMS/HCC)    Chronic pain    H/O deep venous thrombosis    Depression with  anxiety    Duodenitis    Homozygous Factor V Leiden mutation (CMS/HCC)    Irregular menses    IUD contraception    Macromastia    Oligomenorrhea    Pain, upper back    Spinal arthritis    Tinea corporis    Vitamin D deficiency    Weight loss, unintentional    Borderline personality disorder (CMS/HCC)    Episodic lightheadedness    Encounter for gynecological examination without abnormal finding    Acute non-recurrent maxillary sinusitis    Chronic sinusitis of both maxillary sinuses    Preseptal cellulitis of right eye    Recurrent sinus infections         No results found for this or any previous visit (from the past 672 hour(s)).     Objective    Visit Vitals    Visit Vitals  Smoking Status Never       There is no height or weight on file to calculate BMI.     Physical Exam       Assessment    1. Recurrent sinus infections  CT sinus wo IV contrast   Is difficult to be sure because this is a virtual visit but her symptoms do not sound like a current sinus infection.  She has been getting them recurrently over the last 6 months so I am going to order a CT scan of her sinuses to look at the anatomy and check the lining.  I would not recommend an antibiotic at this point and we discussed the danger of using antibiotics too much.  She can continue with the fluticasone nasal spray and I would recommend over-the-counter Zyrtec once daily.  She does have an appointment with the ENT in 2 weeks.  If her symptoms do worsen especially with a fever or a lot of sinus pain she should call us for advice.

## 2024-02-08 ENCOUNTER — HOSPITAL ENCOUNTER (OUTPATIENT)
Dept: RADIOLOGY | Facility: CLINIC | Age: 24
Discharge: HOME | End: 2024-02-08
Payer: COMMERCIAL

## 2024-02-08 DIAGNOSIS — J32.9 RECURRENT SINUS INFECTIONS: ICD-10-CM

## 2024-02-08 PROCEDURE — 70486 CT MAXILLOFACIAL W/O DYE: CPT

## 2024-02-08 PROCEDURE — 70486 CT MAXILLOFACIAL W/O DYE: CPT | Performed by: RADIOLOGY

## 2024-02-09 NOTE — RESULT ENCOUNTER NOTE
Please inform the patient that her sinus CT scan does show some thickening of the lining in the maxillary sinuses.  The other sinuses look normal.  I recommend that she review this with her ENT at her upcoming appointment.

## 2024-02-27 ENCOUNTER — APPOINTMENT (OUTPATIENT)
Dept: OTOLARYNGOLOGY | Facility: CLINIC | Age: 24
End: 2024-02-27
Payer: COMMERCIAL

## 2024-03-19 ENCOUNTER — LAB (OUTPATIENT)
Dept: LAB | Facility: LAB | Age: 24
End: 2024-03-19
Payer: COMMERCIAL

## 2024-03-19 ENCOUNTER — OFFICE VISIT (OUTPATIENT)
Dept: OTOLARYNGOLOGY | Facility: CLINIC | Age: 24
End: 2024-03-19
Payer: COMMERCIAL

## 2024-03-19 DIAGNOSIS — J32.9 CHRONIC SINUSITIS, UNSPECIFIED LOCATION: Primary | ICD-10-CM

## 2024-03-19 DIAGNOSIS — J32.9 CHRONIC SINUSITIS, UNSPECIFIED LOCATION: ICD-10-CM

## 2024-03-19 PROCEDURE — 86317 IMMUNOASSAY INFECTIOUS AGENT: CPT

## 2024-03-19 PROCEDURE — 1036F TOBACCO NON-USER: CPT | Performed by: PHYSICIAN ASSISTANT

## 2024-03-19 PROCEDURE — 99203 OFFICE O/P NEW LOW 30 MIN: CPT | Performed by: PHYSICIAN ASSISTANT

## 2024-03-19 PROCEDURE — 36415 COLL VENOUS BLD VENIPUNCTURE: CPT

## 2024-03-19 PROCEDURE — 82784 ASSAY IGA/IGD/IGG/IGM EACH: CPT

## 2024-03-19 NOTE — PROGRESS NOTES
Vera Nash is a 23 y.o. year old female patient with Sinusitis     Patient presents to the office today for assessment of sinuses.  Patient reports that she has been chronically ill since August or September.  She states that she has been on several rounds of antibiotics and has been utilizing nasal steroids but still symptomatic.  She states that she has had chronic fatigue recurrent infection and just overall not feeling well.  She has been experiencing nasal congestion.  She states that nasal steroids have not been effective.  She had CT scan performed which does show mucosal thickening in the maxillary sinuses bilaterally which appear to occlude the ostiomeatal complex bilaterally.  She is here today for further assessment.  All other ENT issues are negative.      Review of Systems   All other systems reviewed and are negative.        Physical Exam: General appearance: No acute distress. Normal facies. Symmetric facial movement. No gross lesions of the face are noted.  The external ear structures appear normal. The ear canals patent and the tympanic membranes are intact without evidence of air-fluid levels, retraction, or congenital defects.  Anterior rhinoscopy notes essentially a midline nasal septum. Examination is noted for normal healthy mucosal membranes without any evidence of lesions, polyps, or exudate. The tongue is normally mobile. There are no lesions on the gingiva, buccal, or oral mucosa. There are no oral cavity masses.  The neck is negative for mass lymphadenopathy. The trachea and parotid are clear. The thyroid bed is grossly unremarkable. The salivary gland structures are grossly unremarkable.    Assessment/Plan   1.  Chronic sinusitis  Patient seen in the office today for assessment of chronic sinusitis.  The patient does have some changes noted to the maxillary sinus and ostiomeatal complex bilaterally.  Patient at this time however is going to be set up for immune blood work.  We will  see the patient back following labs to review.

## 2024-03-20 LAB
IGG SERPL-MCNC: 810 MG/DL (ref 700–1600)
IGG1 SER-MCNC: 491 MG/DL (ref 490–1140)
IGG2 SER-MCNC: 234 MG/DL (ref 150–640)
IGG3 SER-MCNC: 90 MG/DL (ref 11–85)
IGG4 SER-MCNC: 167 MG/DL (ref 3–200)

## 2024-03-22 LAB
HAEM INFLU B IGG SER-MCNC: 13.1 UG/ML
S PN DA SERO 19F IGG SER-MCNC: 4.8 UG/ML
S PNEUM DA 1 IGG SER-MCNC: 0.66 UG/ML
S PNEUM DA 10A IGG SER-MCNC: 0.47 UG/ML
S PNEUM DA 11A IGG SER-MCNC: 0.13 UG/ML
S PNEUM DA 12F IGG SER-MCNC: 0.57 UG/ML
S PNEUM DA 14 IGG SER-MCNC: 0.62 UG/ML
S PNEUM DA 15B IGG SER-MCNC: 1.18 UG/ML
S PNEUM DA 17F IGG SER-MCNC: 0.4 UG/ML
S PNEUM DA 18C IGG SER-MCNC: 0.35 UG/ML
S PNEUM DA 19A IGG SER-MCNC: 2.52 UG/ML
S PNEUM DA 2 IGG SER-MCNC: 1.15 UG/ML
S PNEUM DA 20A IGG SER-MCNC: 0.44 UG/ML
S PNEUM DA 22F IGG SER-MCNC: 0.72 UG/ML
S PNEUM DA 23F IGG SER-MCNC: 0.32 UG/ML
S PNEUM DA 3 IGG SER-MCNC: 0.45 UG/ML
S PNEUM DA 33F IGG SER-MCNC: 0.65 UG/ML
S PNEUM DA 4 IGG SER-MCNC: 0.19 UG/ML
S PNEUM DA 5 IGG SER-MCNC: 0.51 UG/ML
S PNEUM DA 6B IGG SER-MCNC: 0.33 UG/ML
S PNEUM DA 7F IGG SER-MCNC: 1.2 UG/ML
S PNEUM DA 8 IGG SER-MCNC: 0.36 UG/ML
S PNEUM DA 9N IGG SER-MCNC: 0.5 UG/ML
S PNEUM DA 9V IGG SER-MCNC: 1.49 UG/ML
S PNEUM SEROTYPE IGG SER-IMP: NORMAL

## 2024-03-22 NOTE — RESULT ENCOUNTER NOTE
Prior to the patient's follow-up on April 10 the patient can be notified that she will need to receive the Pneumovax or Prevnar vaccine from her primary care or local pharmacy.  Thank you

## 2024-04-10 ENCOUNTER — OFFICE VISIT (OUTPATIENT)
Dept: OTOLARYNGOLOGY | Facility: CLINIC | Age: 24
End: 2024-04-10
Payer: COMMERCIAL

## 2024-04-10 DIAGNOSIS — D80.6 ANTI-PNEUMOCOCCAL POLYSACCHARIDE ANTIBODY DEFICIENCY (MULTI): Primary | ICD-10-CM

## 2024-04-10 PROCEDURE — 99213 OFFICE O/P EST LOW 20 MIN: CPT | Performed by: OTOLARYNGOLOGY

## 2024-04-10 NOTE — PROGRESS NOTES
The patient returns.  We are seeing her back today with her completed immune blood panel.  That clearly shows significant deficiency with 20 strep antibody titers low.  She denies any other worrisome ENT symptoms or signs.  No other change in past medical surgical history.    Physical exam:  No acute distress.  The external ear structures appear normal. The ear canals patent and the tympanic membranes are intact without evidence of air-fluid levels, retraction, or congenital defects.  Anterior rhinoscopy notes essentially a midline nasal septum. Examination is noted for normal healthy mucosal membranes without any evidence of lesions, polyps, or exudate. The tongue is normally mobile. There are no lesions on the gingiva, buccal, or oral mucosa. There are no oral cavity masses.  The neck is negative for mass lymphadenopathy. The trachea and parotid are clear. The thyroid bed is grossly unremarkable. The salivary gland structures are grossly unremarkable.    Assessment and plan:  History of chronic sinusitis with clear evidence of significant antibody deficiency for pneumococcus.  She will be sent for dedicated Prevnar/Pneumovax 23 and we will see how her responses with a repeat check of the titers in 6 weeks after the shot is given.  All questions were answered in this regard accordingly.

## 2024-04-15 DIAGNOSIS — J32.9 CHRONIC SINUSITIS, UNSPECIFIED LOCATION: Primary | ICD-10-CM

## 2024-04-15 DIAGNOSIS — D80.6 ANTI-PNEUMOCOCCAL POLYSACCHARIDE ANTIBODY DEFICIENCY (MULTI): Primary | ICD-10-CM

## 2024-05-23 ENCOUNTER — OFFICE VISIT (OUTPATIENT)
Dept: PRIMARY CARE | Facility: CLINIC | Age: 24
End: 2024-05-23
Payer: COMMERCIAL

## 2024-05-23 ENCOUNTER — LAB (OUTPATIENT)
Dept: LAB | Facility: LAB | Age: 24
End: 2024-05-23
Payer: COMMERCIAL

## 2024-05-23 VITALS
HEIGHT: 67 IN | BODY MASS INDEX: 28.41 KG/M2 | HEART RATE: 94 BPM | TEMPERATURE: 97 F | RESPIRATION RATE: 18 BRPM | SYSTOLIC BLOOD PRESSURE: 114 MMHG | DIASTOLIC BLOOD PRESSURE: 70 MMHG | WEIGHT: 181 LBS

## 2024-05-23 DIAGNOSIS — M25.50 ARTHRALGIA, UNSPECIFIED JOINT: ICD-10-CM

## 2024-05-23 DIAGNOSIS — R40.0 DAYTIME SOMNOLENCE: ICD-10-CM

## 2024-05-23 DIAGNOSIS — R53.83 FATIGUE, UNSPECIFIED TYPE: ICD-10-CM

## 2024-05-23 DIAGNOSIS — R53.83 FATIGUE, UNSPECIFIED TYPE: Primary | ICD-10-CM

## 2024-05-23 DIAGNOSIS — R06.83 SNORING: ICD-10-CM

## 2024-05-23 DIAGNOSIS — R41.89 BRAIN FOG: ICD-10-CM

## 2024-05-23 DIAGNOSIS — G47.8 NON-RESTORATIVE SLEEP: ICD-10-CM

## 2024-05-23 LAB
ALBUMIN SERPL BCP-MCNC: 4.5 G/DL (ref 3.4–5)
ALP SERPL-CCNC: 66 U/L (ref 33–110)
ALT SERPL W P-5'-P-CCNC: 9 U/L (ref 7–45)
ANION GAP SERPL CALC-SCNC: 14 MMOL/L (ref 10–20)
AST SERPL W P-5'-P-CCNC: 10 U/L (ref 9–39)
BILIRUB SERPL-MCNC: 0.4 MG/DL (ref 0–1.2)
BUN SERPL-MCNC: 9 MG/DL (ref 6–23)
CALCIUM SERPL-MCNC: 9.2 MG/DL (ref 8.6–10.3)
CHLORIDE SERPL-SCNC: 107 MMOL/L (ref 98–107)
CO2 SERPL-SCNC: 21 MMOL/L (ref 21–32)
CREAT SERPL-MCNC: 0.77 MG/DL (ref 0.5–1.05)
CRP SERPL-MCNC: 0.3 MG/DL
EGFRCR SERPLBLD CKD-EPI 2021: >90 ML/MIN/1.73M*2
FERRITIN SERPL-MCNC: 102 NG/ML (ref 8–150)
FOLATE SERPL-MCNC: >22.3 NG/ML
GLUCOSE SERPL-MCNC: 94 MG/DL (ref 74–99)
POTASSIUM SERPL-SCNC: 4.2 MMOL/L (ref 3.5–5.3)
PROT SERPL-MCNC: 6.4 G/DL (ref 6.4–8.2)
SODIUM SERPL-SCNC: 138 MMOL/L (ref 136–145)
TSH SERPL-ACNC: 1.66 MIU/L (ref 0.44–3.98)
VIT B12 SERPL-MCNC: 250 PG/ML (ref 211–911)

## 2024-05-23 PROCEDURE — 85652 RBC SED RATE AUTOMATED: CPT

## 2024-05-23 PROCEDURE — 85027 COMPLETE CBC AUTOMATED: CPT

## 2024-05-23 PROCEDURE — 1036F TOBACCO NON-USER: CPT | Performed by: FAMILY MEDICINE

## 2024-05-23 PROCEDURE — 86038 ANTINUCLEAR ANTIBODIES: CPT

## 2024-05-23 PROCEDURE — 82746 ASSAY OF FOLIC ACID SERUM: CPT

## 2024-05-23 PROCEDURE — 36415 COLL VENOUS BLD VENIPUNCTURE: CPT

## 2024-05-23 PROCEDURE — 82607 VITAMIN B-12: CPT

## 2024-05-23 PROCEDURE — 86481 TB AG RESPONSE T-CELL SUSP: CPT

## 2024-05-23 PROCEDURE — 87476 LYME DIS DNA AMP PROBE: CPT

## 2024-05-23 PROCEDURE — 82728 ASSAY OF FERRITIN: CPT

## 2024-05-23 PROCEDURE — 84443 ASSAY THYROID STIM HORMONE: CPT

## 2024-05-23 PROCEDURE — 80074 ACUTE HEPATITIS PANEL: CPT

## 2024-05-23 PROCEDURE — 99215 OFFICE O/P EST HI 40 MIN: CPT | Performed by: FAMILY MEDICINE

## 2024-05-23 PROCEDURE — 82306 VITAMIN D 25 HYDROXY: CPT

## 2024-05-23 PROCEDURE — 80053 COMPREHEN METABOLIC PANEL: CPT

## 2024-05-23 PROCEDURE — 86140 C-REACTIVE PROTEIN: CPT

## 2024-05-23 PROCEDURE — 87389 HIV-1 AG W/HIV-1&-2 AB AG IA: CPT

## 2024-05-23 PROCEDURE — 86431 RHEUMATOID FACTOR QUANT: CPT

## 2024-05-23 NOTE — PROGRESS NOTES
Vera Nash is a 23 y.o. female here today for   Chief Complaint   Patient presents with    Suspicious Skin Lesion    Fatigue        HPI   Skin lesion on back for a few months.  No pain.  It is located about her bra line slightly to the right of the spine.  She would like me to check to make sure there is nothing dangerous.    She saw ENT and strep pneumo IgG was not sufficient so they gave her Prevnar 20  6 weeks ago.  She will be getting repeat pneumococcal titers in the near future.    She still feels congested and fatigued.      Mom worried she may have Lyme disease.  Had a tick bite when she was a child.  The tick bite was on her scalp and occurred in [place] and the tick was a large black tick.      The patient says she has felt fatigued and not right for a few years and it has gotten worse.  She says it is hard to function at times.  I went into more detail with her symptoms.    Fatigue - feels chronically sleepy and weak.  If she goes for a walk she feels really tired physically.  Gets physically weak and light headed.  Gets fuzzy brain fog when she is working on something for a short time.  Feels thirsty a lot.  She gets inconsistent sleep - 2-8 hours per night.  She says anxiety sometimes causes her to have trouble sleeping.   Was Rxed hydroxyzine to use but does not help as much.  She says that her fatigue does not really seem to be affected by her hours of sleep.  She can get 8 hours of sleep and still feel just as tired.  Mom reports she does have snoring.  She often wakes feeling very tired even if she got a good amount of sleep.  She also complains of diffuse joint pains off and on for years.  She is not having any swelling or redness of her joints.  She is taking quetiapine but she does not feel like it has caused any of her fatigue symptoms because this was going on for a long time prior to starting this medication.  There is no chance of pregnancy.        Current Outpatient Medications:      "escitalopram (Lexapro) 10 mg tablet, Take 1.5 tablets (15 mg) by mouth once daily. Take 1 and 1/2 (one and one-half) tablets by mouth daily., Disp: , Rfl:     fluticasone (Flonase) 50 mcg/actuation nasal spray, Administer 1 spray into each nostril once daily. Shake gently. Before first use, prime pump. After use, clean tip and replace cap., Disp: 16 g, Rfl: 1    hydrOXYzine HCL (Atarax) 50 mg tablet, TAKE 1 TABLET BY MOUTH FOUR TIMES DAILY WITH MEALS AS NEEDED, Disp: , Rfl:     QUEtiapine (SEROquel) 50 mg tablet, Take 1 tablet (50 mg) by mouth once daily at bedtime., Disp: 30 tablet, Rfl: 6    Patient Active Problem List   Diagnosis    Attention disturbance    Bipolar 2 disorder (Multi)    Chronic pain    H/O deep venous thrombosis    Depression with anxiety    Duodenitis    Homozygous Factor V Leiden mutation (Multi)    Irregular menses    IUD contraception    Macromastia    Oligomenorrhea    Pain, upper back    Spinal arthritis    Tinea corporis    Vitamin D deficiency    Weight loss, unintentional    Borderline personality disorder (Multi)    Episodic lightheadedness    Encounter for gynecological examination without abnormal finding    Acute non-recurrent maxillary sinusitis    Chronic sinusitis of both maxillary sinuses    Preseptal cellulitis of right eye    Recurrent sinus infections    Chronic sinusitis    Anti-pneumococcal polysaccharide antibody deficiency (Multi)         No results found for this or any previous visit (from the past 672 hour(s)).     Objective    Visit Vitals    Visit Vitals  /70   Pulse 94   Temp 36.1 °C (97 °F)   Resp 18   Ht 1.702 m (5' 7\")   Wt 82.1 kg (181 lb)   BMI 28.35 kg/m²   OB Status Having periods   Smoking Status Never   BSA 1.97 m²       Body mass index is 28.35 kg/m².     Physical Exam   General - Not in acute distress and cooperative.  Build & Nutrition - Well developed  Posture - Normal  Gait - Normal  Mental Status - alert and oriented x 3    Head - " Normocephalic    Neck - Thyroid normal size.  No lymphadenopathy.    Eyes - Bilateral - Sclera clear and lids pink without edema or mass.      Back-she has a 5 mm lightly pigmented nevus of the right back at the bra line just to the right of the spine.  It has good borders and is not raised.    Lungs - Clear to auscultation and normal breathing effort    Cardiovascular - RRR and no murmurs, rubs or thrill.    Peripheral Vascular - Bilateral - no edema present    Neuropsychiatric - normal mood and affect        Assessment    1. Fatigue, unspecified type  Comprehensive Metabolic Panel, CBC, TSH with reflex to Free T4 if abnormal, Vitamin D 25-Hydroxy,Total (for eval of Vitamin D levels), Vitamin B12, Folate, Ferritin, Lyme disease, PCR, Hepatitis panel, acute, HIV 1/2 Antigen/Antibody Screen with Reflex to Confirmation, KURT without Reflex TANVI, Rheumatoid Factor, Sedimentation Rate, C-Reactive Protein, T-Spot TB   Patient has fatigue and a number of other symptoms as listed.  We are going to work this up further to make sure there is not underlying infectious, metabolic, nutritional, rheumatologic causes.  She will get these labs and we will call her with results.    I also suspect there may be undiagnosed sleep apnea because of her daytime somnolence and other symptoms.  We will get a home sleep study and call her with results.  We will follow-up in 1 month.   2. Daytime somnolence  Comprehensive Metabolic Panel, CBC, TSH with reflex to Free T4 if abnormal, Vitamin D 25-Hydroxy,Total (for eval of Vitamin D levels), Vitamin B12, Folate, Ferritin, Lyme disease, PCR, Hepatitis panel, acute, HIV 1/2 Antigen/Antibody Screen with Reflex to Confirmation, KURT without Reflex TANVI, Rheumatoid Factor, Sedimentation Rate, C-Reactive Protein, T-Spot TB, Home sleep apnea test (HSAT)      3. Snoring  Home sleep apnea test (HSAT)      4. Non-restorative sleep  Home sleep apnea test (HSAT)      5. Arthralgia, unspecified joint   Comprehensive Metabolic Panel, CBC, Lyme disease, PCR, Hepatitis panel, acute, HIV 1/2 Antigen/Antibody Screen with Reflex to Confirmation, KURT without Reflex TANVI, Rheumatoid Factor, Sedimentation Rate, C-Reactive Protein      6. Brain fog  TSH with reflex to Free T4 if abnormal, Hepatitis panel, acute, HIV 1/2 Antigen/Antibody Screen with Reflex to Confirmation                  Orders Placed This Encounter   Procedures    Comprehensive Metabolic Panel    CBC    TSH with reflex to Free T4 if abnormal    Vitamin D 25-Hydroxy,Total (for eval of Vitamin D levels)    Vitamin B12    Folate    Ferritin    Lyme disease, PCR    Hepatitis panel, acute    HIV 1/2 Antigen/Antibody Screen with Reflex to Confirmation    KURT without Reflex TANVI    Rheumatoid Factor    Sedimentation Rate    C-Reactive Protein    T-Spot TB    Home sleep apnea test (HSAT)        No orders of the defined types were placed in this encounter.

## 2024-05-24 LAB
25(OH)D3 SERPL-MCNC: 24 NG/ML (ref 30–100)
ERYTHROCYTE [DISTWIDTH] IN BLOOD BY AUTOMATED COUNT: 12 % (ref 11.5–14.5)
ERYTHROCYTE [SEDIMENTATION RATE] IN BLOOD BY WESTERGREN METHOD: 7 MM/H (ref 0–20)
HAV IGM SER QL: NONREACTIVE
HBV CORE IGM SER QL: NONREACTIVE
HBV SURFACE AG SERPL QL IA: NONREACTIVE
HCT VFR BLD AUTO: 38.6 % (ref 36–46)
HCV AB SER QL: NONREACTIVE
HGB BLD-MCNC: 13.3 G/DL (ref 12–16)
HIV 1+2 AB+HIV1 P24 AG SERPL QL IA: NONREACTIVE
MCH RBC QN AUTO: 29.6 PG (ref 26–34)
MCHC RBC AUTO-ENTMCNC: 34.5 G/DL (ref 32–36)
MCV RBC AUTO: 86 FL (ref 80–100)
NRBC BLD-RTO: 0 /100 WBCS (ref 0–0)
PLATELET # BLD AUTO: 262 X10*3/UL (ref 150–450)
RBC # BLD AUTO: 4.5 X10*6/UL (ref 4–5.2)
RHEUMATOID FACT SER NEPH-ACNC: <10 IU/ML (ref 0–15)
WBC # BLD AUTO: 6.5 X10*3/UL (ref 4.4–11.3)

## 2024-05-24 NOTE — RESULT ENCOUNTER NOTE
Please inform the patient that her vitamin D level is slightly low and I recommend that she take 2000 units daily over-the-counter.  The rest of her labs are all completely normal or negative.  There are still a few pending such as the Lyme disease titer, KURT and tuberculosis testing.  We will call her with results when they are available.

## 2024-05-25 LAB
NIL(NEG) CONTROL SPOT COUNT: ABNORMAL
PANEL A SPOT COUNT: 2
PANEL B SPOT COUNT: 5
POS CONTROL SPOT COUNT: ABNORMAL
T-SPOT. TB INTERPRETATION: ABNORMAL

## 2024-05-26 LAB
B BURGDOR DNA SPEC QL NAA+PROBE: NOT DETECTED
SPECIMEN SOURCE: NORMAL

## 2024-05-28 LAB — ANA SER QL HEP2 SUBST: NEGATIVE

## 2024-05-29 NOTE — RESULT ENCOUNTER NOTE
Please inform the patient that her Lyme disease titer and KRUT were both normal.  Her T spot test for tuberculosis came back as borderline.  I think it is very likely that she does not have tuberculosis so I think this lab is an error.  We can repeat her T spot test in 3 months or we could have her come in for a PPD test.  Please explain the PPD test to her and if she wants to come in for a nurse visit we could place it today and read it on Friday.  Please let me know what she wants to do.

## 2024-06-03 ENCOUNTER — TELEPHONE (OUTPATIENT)
Dept: PRIMARY CARE | Facility: CLINIC | Age: 24
End: 2024-06-03
Payer: COMMERCIAL

## 2024-06-03 DIAGNOSIS — A15.9 TUBERCULOSIS: ICD-10-CM

## 2024-06-03 NOTE — TELEPHONE ENCOUNTER
----- Message from Kervin Gomez MD sent at 5/29/2024  8:44 AM EDT -----  Please inform the patient that her Lyme disease titer and KURT were both normal.  Her T spot test for tuberculosis came back as borderline.  I think it is very likely that she does not have tuberculosis so I think this lab is an error.  We can repeat her T spot test in 3 months or we could have her come in for a PPD test.  Please explain the PPD test to her and if she wants to come in for a nurse visit we could place it today and read it on Friday.  Please let me know what she wants to do.

## 2024-06-25 ENCOUNTER — APPOINTMENT (OUTPATIENT)
Dept: PRIMARY CARE | Facility: CLINIC | Age: 24
End: 2024-06-25
Payer: COMMERCIAL

## 2024-07-10 ENCOUNTER — APPOINTMENT (OUTPATIENT)
Dept: PRIMARY CARE | Facility: CLINIC | Age: 24
End: 2024-07-10
Payer: COMMERCIAL

## 2024-07-10 PROBLEM — I82.629 DEEP VEIN THROMBOSIS (DVT) OF UPPER EXTREMITY (MULTI): Status: ACTIVE | Noted: 2024-07-10

## 2024-07-10 PROBLEM — Z91.51 HISTORY OF SUICIDE ATTEMPT: Status: ACTIVE | Noted: 2024-07-10

## 2024-07-10 PROBLEM — G47.00 INSOMNIA: Status: ACTIVE | Noted: 2024-07-10

## 2024-10-07 ENCOUNTER — OFFICE VISIT (OUTPATIENT)
Dept: PRIMARY CARE | Facility: CLINIC | Age: 24
End: 2024-10-07
Payer: COMMERCIAL

## 2024-10-07 ENCOUNTER — HOSPITAL ENCOUNTER (OUTPATIENT)
Dept: RADIOLOGY | Facility: HOSPITAL | Age: 24
Discharge: HOME | End: 2024-10-07
Payer: COMMERCIAL

## 2024-10-07 VITALS
HEART RATE: 78 BPM | SYSTOLIC BLOOD PRESSURE: 118 MMHG | DIASTOLIC BLOOD PRESSURE: 74 MMHG | BODY MASS INDEX: 28.88 KG/M2 | RESPIRATION RATE: 18 BRPM | WEIGHT: 184 LBS | TEMPERATURE: 97 F | HEIGHT: 67 IN

## 2024-10-07 DIAGNOSIS — M79.89 RIGHT LEG SWELLING: Primary | ICD-10-CM

## 2024-10-07 DIAGNOSIS — M79.89 RIGHT LEG SWELLING: ICD-10-CM

## 2024-10-07 PROCEDURE — 1036F TOBACCO NON-USER: CPT | Performed by: FAMILY MEDICINE

## 2024-10-07 PROCEDURE — 3008F BODY MASS INDEX DOCD: CPT | Performed by: FAMILY MEDICINE

## 2024-10-07 PROCEDURE — 93971 EXTREMITY STUDY: CPT

## 2024-10-07 PROCEDURE — 99214 OFFICE O/P EST MOD 30 MIN: CPT | Performed by: FAMILY MEDICINE

## 2024-10-07 PROCEDURE — 93971 EXTREMITY STUDY: CPT | Performed by: RADIOLOGY

## 2024-10-07 NOTE — PROGRESS NOTES
Vera Nash is a 24 y.o. female here today for   Chief Complaint   Patient presents with    Knee Pain        Knee Pain   The incident occurred 2 days ago. The pain is present in the right knee.      Right knee pain since 2 days ago.  Patient says that over her posterior knee she has had a sore and tender area since yesterday.  Was sore especially yesterday and this AM.  A little better today.  No injury or any new activities that could have caused it.  She says that her proximal calf is also slightly tender and sore.  She has not really noticed any increased calf size.  There is no redness or warmth.  She is here today with her mom.  The patient does have a history of factor V Leiden mutation and a previous left arm DVT that she was on Xarelto for 6 months about 5 years ago.  They are worried that this could be a recurrence of DVT.  She has had no periods of immobility or confinement of her legs.  She is not on any new medications.  She denies any chest pain or shortness of breath.      Current Outpatient Medications:     escitalopram (Lexapro) 10 mg tablet, Take 1.5 tablets (15 mg) by mouth once daily. Take 1 and 1/2 (one and one-half) tablets by mouth daily., Disp: , Rfl:     hydrOXYzine HCL (Atarax) 50 mg tablet, TAKE 1 TABLET BY MOUTH FOUR TIMES DAILY WITH MEALS AS NEEDED, Disp: , Rfl:     QUEtiapine (SEROquel) 50 mg tablet, Take 1 tablet (50 mg) by mouth once daily at bedtime., Disp: 30 tablet, Rfl: 6    Patient Active Problem List   Diagnosis    Attention disturbance    Bipolar 2 disorder (Multi)    Chronic pain    H/O deep venous thrombosis    Depression with anxiety    Duodenitis    Homozygous Factor V Leiden mutation (Multi)    Irregular menses    IUD contraception    Macromastia    Oligomenorrhea    Pain, upper back    Spinal arthritis    Tinea corporis    Vitamin D deficiency    Weight loss, unintentional    Borderline personality disorder (Multi)    Episodic lightheadedness    Encounter for  "gynecological examination without abnormal finding    Acute non-recurrent maxillary sinusitis    Chronic sinusitis of both maxillary sinuses    Preseptal cellulitis of right eye    Recurrent sinus infections    Chronic sinusitis    Anti-pneumococcal polysaccharide antibody deficiency (Multi)    Deep vein thrombosis (DVT) of upper extremity (Multi)    History of suicide attempt    Insomnia         No results found for this or any previous visit (from the past 672 hour(s)).     Objective    Visit Vitals    Visit Vitals  /74   Pulse 78   Temp 36.1 °C (97 °F)   Resp 18   Ht 1.702 m (5' 7\")   Wt 83.5 kg (184 lb)   BMI 28.82 kg/m²   OB Status Having periods   Smoking Status Never   BSA 1.99 m²       Body mass index is 28.82 kg/m².     Physical Exam     Legs-there is an area of tenderness of the posterior knee which extends down into the proximal calf.  I do not really feel any swelling and there is no color change or warmth of these areas.  Her calf size seems symmetrical and Homans' sign is negative.    Assessment & Plan  Right leg swelling  The patient is at high risk for DVT because of her factor V Leiden mutation and this may represent an early DVT of the right leg.  We are going to get a stat ultrasound duplex to make sure.  We will call them with results as soon as they are available and if necessary we will restart her on an anticoagulant.  If the ultrasound is negative then this may represent some type of muscular strain and we will treat it conservatively.  My staff will help her schedule the ultrasound today and we should have results later today or tomorrow morning.    Orders:    Vascular US Lower Extremity Venous Duplex Right; Future               No orders of the defined types were placed in this encounter.       No orders of the defined types were placed in this encounter.         "

## 2024-12-19 ENCOUNTER — APPOINTMENT (OUTPATIENT)
Dept: PRIMARY CARE | Facility: CLINIC | Age: 24
End: 2024-12-19
Payer: COMMERCIAL

## 2024-12-19 ENCOUNTER — TELEPHONE (OUTPATIENT)
Dept: PRIMARY CARE | Facility: CLINIC | Age: 24
End: 2024-12-19

## 2024-12-19 DIAGNOSIS — F41.8 DEPRESSION WITH ANXIETY: Primary | ICD-10-CM

## 2024-12-19 RX ORDER — ESCITALOPRAM OXALATE 10 MG/1
15 TABLET ORAL DAILY
Qty: 45 TABLET | Refills: 0 | Status: SHIPPED | OUTPATIENT
Start: 2024-12-19

## 2024-12-19 NOTE — TELEPHONE ENCOUNTER
Pt rescheduled her follow up from 12/19/2024 to 1/08/2024. Req refill of Lexapro be sent to local pharm to hold her over: [place] [facility].

## 2025-01-08 ENCOUNTER — APPOINTMENT (OUTPATIENT)
Dept: PRIMARY CARE | Facility: CLINIC | Age: 25
End: 2025-01-08
Payer: COMMERCIAL

## 2025-03-20 NOTE — LETTER
May 22, 2022      Urgent Care - [address] [address]  Phone: [phone]  Fax: [phone]       Patient: Laney Kaiser   YOB: 2000  Date of Visit: 05/22/2022    To Whom It May Concern:    Danielle Kaiser  was at [facility] on 05/22/2022. The patient may return to work/school on 5/23/2022 with no restrictions. If you have any questions or concerns, or if I can be of further assistance, please do not hesitate to contact me.    Sincerely,    Nargis Trpip, NP      Physical Therapy Visit    Visit Type: Daily Treatment Note  Visit: 6  Referring Provider: Belen Allison, *  Medical Diagnosis (from order): S89.91XS - Right knee injury, sequela  M25.512, G89.29 - Chronic left shoulder pain     SUBJECTIVE                                                                                                               Patient reports she has not been compliant with her HEP.  Patient reports she has been fatigued.   Patient reports she is most concerned about her knee and hip. Patient has a stationary bike that she uses at home.  Patient wanting to practice her current HEP.    Pain / Symptoms  - Pain rating (out of 10): Current: 0       OBJECTIVE                                                                                                                                       Treatment     Therapeutic Exercise  Patient wanting to review exercises from her HEP  Nustep, SH 6, arm length 6, level 6.0 x 5 min for warm up  Long sit hamstring stretch on mat right, left 2 x 30 sec  Standing gastroc stretch in a staggered stance position right, left 2 x 30  Sit to/from stand from chair x 10  Bridge with glut set x 10  Seated hamstring stretch in edge of mat right , left x 30 sec    Skilled input: verbal instruction/cues, tactile instruction/cues, posture correction, facilitation and as detailed above    Writer verbally educated and received verbal consent for hand placement, positioning of patient, and techniques to be performed today from patient for clothing adjustments for techniques, therapist position for techniques and hand placement and palpation for techniques as described above and how they are pertinent to the patient's plan of care.  Home Exercise Program  HEP: educated sleep position side lying with pillow support  Access Code: [email or web address]  URL: [email or web address]  Date: 03/06/2025  Prepared by: Irma Murdock    Exercises  - Supine Knee Extension  Stretch on Towel Roll  - 1-2 x daily - 7 x weekly - 2-3 sets - 3-5 reps - 15-20 second hold  - Supine Bridge  - 1-22 x daily - 7 x weekly - 2-3 sets - 10-15 reps  - Supine Knee Extension Strengthening  - 1 x daily - 7 x weekly - 2 sets - 10 reps  - Standing Gastroc Stretch  - 1 x daily - 7 x weekly - 1 sets - 3 reps - 30 secs hold  - Seated Hamstring Stretch  - 1 x daily - 7 x weekly - 1 sets - 3 reps - 30 secs hold  - Standing Lumbar Extension at Wall  - 1 x daily - 7 x weekly - 1 sets - 5 reps - 10 secs hold  - Sit to Stand  - 1 x daily - 7 x weekly - 2 sets - 10 reps  - Seated Scapular Retraction  - 1 x daily - 7 x weekly - 1-2 sets - 10 reps - 5 hold  - Supine Shoulder Flexion Extension AAROM with Dowel  - 1 x daily - 7 x weekly - 1-2 sets - 10 reps  - Supine Shoulder External Rotation with Dowel  - 1 x daily - 7 x weekly - 1-2 sets - 10 reps  - Sidelying Shoulder External Rotation  - 1 x daily - 7 x weekly - 1-2 sets - 10 reps  - Mountain Climber on Counter  - 1 x daily - 7 x weekly - 2 sets - 10 reps  - Backward Walking with Counter Support  - 1 x daily - 7 x weekly - 2 sets - 10 reps  - Hooklying Isometric Hip Flexion  - 1 x daily - 7 x weekly - 3 sets - 10 reps      ASSESSMENT                                                                                                            Patient wanted to focus on reviewing exercises from her HEP today.  Patient able to perform with decreased cueing.  Patient continues to benefit from therapy to address deficits in order to maximize level of function.    Education:   - Results of above outlined education: Verbalizes understanding, Demonstrates understanding and Needs reinforcement    PLAN                                                                                                                           Suggestions for next session as indicated: Progress per plan of care       Therapy procedure time and total treatment time can be found documented on the Time  Entry flowsheet